# Patient Record
Sex: FEMALE | Race: WHITE | Employment: FULL TIME
[De-identification: names, ages, dates, MRNs, and addresses within clinical notes are randomized per-mention and may not be internally consistent; named-entity substitution may affect disease eponyms.]

---

## 2017-06-10 ENCOUNTER — HEALTH MAINTENANCE LETTER (OUTPATIENT)
Age: 22
End: 2017-06-10

## 2017-09-15 ENCOUNTER — RESULT FOLLOW UP (OUTPATIENT)
Dept: OBGYN | Facility: CLINIC | Age: 22
End: 2017-09-15

## 2017-09-15 ENCOUNTER — OFFICE VISIT (OUTPATIENT)
Dept: OBGYN | Facility: CLINIC | Age: 22
End: 2017-09-15
Payer: COMMERCIAL

## 2017-09-15 VITALS
HEIGHT: 71 IN | DIASTOLIC BLOOD PRESSURE: 60 MMHG | SYSTOLIC BLOOD PRESSURE: 102 MMHG | BODY MASS INDEX: 23.52 KG/M2 | WEIGHT: 168 LBS

## 2017-09-15 DIAGNOSIS — Z01.419 ENCOUNTER FOR GYNECOLOGICAL EXAMINATION WITHOUT ABNORMAL FINDING: Primary | ICD-10-CM

## 2017-09-15 DIAGNOSIS — R87.612 PAPANICOLAOU SMEAR OF CERVIX WITH LOW GRADE SQUAMOUS INTRAEPITHELIAL LESION (LGSIL): ICD-10-CM

## 2017-09-15 DIAGNOSIS — Z97.5 IUD (INTRAUTERINE DEVICE) IN PLACE: ICD-10-CM

## 2017-09-15 DIAGNOSIS — Z11.3 SCREEN FOR STD (SEXUALLY TRANSMITTED DISEASE): ICD-10-CM

## 2017-09-15 DIAGNOSIS — F41.9 ANXIETY: ICD-10-CM

## 2017-09-15 PROCEDURE — 99395 PREV VISIT EST AGE 18-39: CPT | Performed by: OBSTETRICS & GYNECOLOGY

## 2017-09-15 PROCEDURE — 87591 N.GONORRHOEAE DNA AMP PROB: CPT | Performed by: OBSTETRICS & GYNECOLOGY

## 2017-09-15 PROCEDURE — G0145 SCR C/V CYTO,THINLAYER,RESCR: HCPCS | Performed by: OBSTETRICS & GYNECOLOGY

## 2017-09-15 PROCEDURE — G0124 SCREEN C/V THIN LAYER BY MD: HCPCS | Performed by: OBSTETRICS & GYNECOLOGY

## 2017-09-15 PROCEDURE — 87491 CHLMYD TRACH DNA AMP PROBE: CPT | Performed by: OBSTETRICS & GYNECOLOGY

## 2017-09-15 ASSESSMENT — ANXIETY QUESTIONNAIRES
6. BECOMING EASILY ANNOYED OR IRRITABLE: MORE THAN HALF THE DAYS
7. FEELING AFRAID AS IF SOMETHING AWFUL MIGHT HAPPEN: NOT AT ALL
GAD7 TOTAL SCORE: 10
3. WORRYING TOO MUCH ABOUT DIFFERENT THINGS: SEVERAL DAYS
5. BEING SO RESTLESS THAT IT IS HARD TO SIT STILL: NEARLY EVERY DAY
2. NOT BEING ABLE TO STOP OR CONTROL WORRYING: SEVERAL DAYS
IF YOU CHECKED OFF ANY PROBLEMS ON THIS QUESTIONNAIRE, HOW DIFFICULT HAVE THESE PROBLEMS MADE IT FOR YOU TO DO YOUR WORK, TAKE CARE OF THINGS AT HOME, OR GET ALONG WITH OTHER PEOPLE: SOMEWHAT DIFFICULT
1. FEELING NERVOUS, ANXIOUS, OR ON EDGE: MORE THAN HALF THE DAYS

## 2017-09-15 ASSESSMENT — PATIENT HEALTH QUESTIONNAIRE - PHQ9
5. POOR APPETITE OR OVEREATING: SEVERAL DAYS
SUM OF ALL RESPONSES TO PHQ QUESTIONS 1-9: 3

## 2017-09-15 NOTE — LETTER
September 19, 2019      Vida Alvarez  [address]  [place] [address]    Dear ,      This letter is to remind you that you are due for your follow up PAP smear on or about 10/03/19.    Please call [phone] to schedule your appointment at your earliest convenience.     If you have completed the tests outside of [place], please have the results forwarded to our office. We will update the chart for your primary Physician to review before your next annual physical.     Sincerely,      Your [place] Care Team/[facility]

## 2017-09-15 NOTE — LETTER
August 31, 2018      Vida Broussardtianna  [address] [address]    Dear ,      At [facility], your health and wellness is our primary concern. That is why we are following up on an abnormal pap from 09/15/17, which was reported as LSIL. Your provider had recommended that you have a Pap smear completed by 09/15/18. Our records do not show that this has been scheduled.    It is important to complete the follow up that your provider has suggested for you to ensure that there are no worsening changes which may, over time, develop into cancer.      Please contact our office at  [phone] to schedule an appointment for a Pap smear at your earliest convenience. If you have questions or concerns, please call the clinic and we will be happy to assist you.    If you have completed the tests outside of [facility], please have the results forwarded to our office. We will update the chart for your primary Physician to review before your next annual physical.     Thank you for choosing [facility]!    Sincerely,      Zina Meyers MD/felisha

## 2017-09-15 NOTE — PROGRESS NOTES
Vida is a 21 year old  female who presents for annual exam.     Besides routine health maintenance,  she would like to discuss anxiety & lump/possible cyst on breast a month ago.    HPI:  Here today for yearly exam --has seen CE in the past.  Amenorrheic with Mirena IUD in place (2016).  Rare spotting and no cramping.  +SA --no issues.  Had breakup earlier this year --has had new partners since that time.  Denies bowel/bladder issues.   Hx gluten sensitivity --does good job with diet.  Has noticed some dairy issues as well as of late  Has noticed more anxiety in the last few months --was on sertraline in college for a while.  Has had a lot of life changes --breakup, move, new job, travel for work, etc.  Some difficulty concentrating and feeling overwhelmed by tasks.  Has never been tested for ADHD.  Has never had counselor/therapist    Single; works for [employer] as  --has been traveling a lot for job -- in [place]; currently seeking a new job  -staying active; belongs to gym and has been doing yoga as much as she can; also helps with anxiety  +SBE on occ; no issues      GYNECOLOGIC HISTORY:    Patient's last menstrual period was 2017 (exact date).  Her current contraception method is: IUD.  She  reports that she has never smoked. She has never used smokeless tobacco.      Patient is sexually active.  STD testing offered?  Declined  Last PHQ-9 score on record =   PHQ-9 SCORE 9/15/2017   Total Score 3     Last GAD7 score on record =   OSORIO-7 SCORE 9/15/2017   Total Score 10     Alcohol Score = 6    HEALTH MAINTENANCE:  Cholesterol: None found.  Last Mammo: Never, Result: not applicable  Pap: (No results found for: PAP ) Due today  Colonoscopy:  Never, Result: not applicable  Dexa:  Never    Health maintenance updated:  yes, doing first pap today    HISTORY:  Obstetric History       T0      L0     SAB0   TAB0   Ectopic0   Multiple0   Live Births0      "      Patient Active Problem List   Diagnosis     Airway obstruction, anatomic     IUD (intrauterine device) in place     Past Surgical History:   Procedure Laterality Date     LAPAROSCOPIC APPENDECTOMY N/A 10/28/2015    Procedure: LAPAROSCOPIC APPENDECTOMY;  Surgeon: Osmin Tirado MD;  Location: [facility] OR     SEPTORHINOPLASTY  5/10/2013    Procedure: SEPTORHINOPLASTY;  Septoplasty/tip Rhinoplasty, Bilateral superficial cauterization of turbinates       TONSILLECTOMY  1998      Social History   Substance Use Topics     Smoking status: Never Smoker     Smokeless tobacco: Never Used     Alcohol use 0.0 oz/week     0 Standard drinks or equivalent per week      Comment: occ      Problem (# of Occurrences) Relation (Name,Age of Onset)    Family History Negative (1) Other            Current Outpatient Prescriptions   Medication Sig     sertraline (ZOLOFT) 50 MG tablet Take 1/2 tablet (25 mg) for 1-2 weeks, then increase to 1 tablet orally daily     levonorgestrel (MIRENA, 52 MG,) 20 MCG/24HR IUD 1 each (20 mcg) by Intrauterine route once for 1 dose     [DISCONTINUED] Sertraline HCl (ZOLOFT PO) Take 25 mg by mouth daily     No current facility-administered medications for this visit.      No Known Allergies    Past medical, surgical, social and family histories were reviewed and updated in EPIC.    ROS:   12 point review of systems negative other than symptoms noted below.  Breast: Lumps  Gastrointestinal: Bloating  Skin: Acne  Psychiatric: Anxiety    EXAM:  /60  Ht 5' 10.5\" (1.791 m)  Wt 168 lb (76.2 kg)  LMP 09/12/2017 (Exact Date)  BMI 23.76 kg/m2   BMI: Body mass index is 23.76 kg/(m^2).    PHYSICAL EXAM:  Constitutional:  Appearance: Well nourished, well developed, alert, in no acute distress  Neck:  Lymph Nodes:  No lymphadenopathy present    Thyroid:  Gland size normal, nontender, no nodules or masses present  on palpation  Chest:  Respiratory Effort:  Breathing unlabored  Cardiovascular:    Heart: " Auscultation:  Regular rate, normal rhythm, no murmurs present  Breasts: Inspection of Breasts:  No lymphadenopathy present., Palpation of Breasts and Axillae:  No masses present on palpation, no breast tenderness., Axillary Lymph Nodes:  No lymphadenopathy present. and No nodularity, asymmetry or nipple discharge bilaterally.  Gastrointestinal:   Abdominal Examination:  Abdomen nontender to palpation, tone normal without rigidity or guarding, no masses present, umbilicus without lesions   Liver and Spleen:  No hepatomegaly present, liver nontender to palpation    Hernias:  No hernias present  Lymphatic: Lymph Nodes:  No other lymphadenopathy present  Skin:  General Inspection:  No rashes present, no lesions present, no areas of  discoloration    Genitalia and Groin:  No rashes present, no lesions present, no areas of  discoloration, no masses present  Neurologic/Psychiatric:    Mental Status:  Oriented X3     Pelvic Exam:  External Genitalia:     Normal appearance for age, no discharge present, no tenderness present, no inflammatory lesions present, color normal  Vagina:    Normal vaginal vault without central or paravaginal defects, no discharge present, no inflammatory lesions present, no masses present  Bladder:     Nontender to palpation  Urethra:   Urethral Body:  Urethra palpation normal, urethra structural support normal   Urethral Meatus:  No erythema or lesions present  Cervix:     Appearance healthy, no lesions present, nontender to palpation, no bleeding present, string present  Uterus:     Nontender to palpation, no masses present, position anteflexed, mobility: normal  Adnexa:     No adnexal tenderness present, no adnexal masses present  Perineum:     Perineum within normal limits, no evidence of trauma, no rashes or skin lesions present  Anus:     Anus within normal limits, no hemorrhoids present  Inguinal Lymph Nodes:     No lymphadenopathy present  Pubic Hair:     Normal pubic hair distribution for  age  Genitalia and Groin:     No rashes present, no lesions present, no areas of discoloration, no masses present      COUNSELING:   Reviewed preventive health counseling, as reflected in patient instructions  Special attention given to:        Regular exercise       Healthy diet/nutrition       Contraception       Safe sex practices/STD prevention    BMI: Body mass index is 23.76 kg/(m^2).        ASSESSMENT:  21 year old female with satisfactory annual exam.  ICD-10-CM    1. Encounter for gynecological examination without abnormal finding Z01.419 Pap imaged thin layer screen only - recommended age 21 - 24 years   2. Anxiety F41.9 sertraline (ZOLOFT) 50 MG tablet   3. IUD (intrauterine device) in place Z97.5    4. Screen for STD (sexually transmitted disease) Z11.3 Chlamydia trachomatis PCR     Neisseria gonorrhoeae PCR       PLAN:  Patient Instructions   Follow up with your primary care provider for your other medical problems.  Continue self breast exam.  Increase physical activity and exercise.  PHQ-9/OSORIO-7 scores were discussed.  Will re-start sertraline for anxiety --if no improvement in focus and work performance in next 4-8wks, may consider ADHD evaluation.  Will call as needed for referral --would consider either [place] referral or [facility]  Lab and pap smear results will be called to the patient.  First reflex pap smear done today and will repeat in 3yrs if negative.  Usual safety and preventative measures counseling done.  Encouraged Vida to consider full STD testing with any new sexual partners.      Zina Meyers MD

## 2017-09-15 NOTE — PATIENT INSTRUCTIONS
Follow up with your primary care provider for your other medical problems.  Continue self breast exam.  Increase physical activity and exercise.  PHQ-9/OSORIO-7 scores were discussed.  Will re-start sertraline for anxiety --if no improvement in focus and work performance in next 4-8wks, may consider ADHD evaluation.  Will call as needed for referral --would consider either [place] referral or [facility]  Lab and pap smear results will be called to the patient.  First reflex pap smear done today and will repeat in 3yrs if negative.  Usual safety and preventative measures counseling done.  Encouraged Vida to consider full STD testing with any new sexual partners.

## 2017-09-15 NOTE — LETTER
September 22, 2017    Vida Alvarez  [address] [address] [address]    Dear Vida,  This letter is in regards to the PAP smear you had done on 9/15/17. The test result is stated to be LSIL or Low-grade Squamous Intraepithelial Lesion. This indicates a mild change only. The vast majority of patients with this result do not have significant cervical abnormalities. Your next PAP smear is due in 1 year.    Please return on or after 9/15/18 for your next pap smear.  Please contact the clinic at [phone] with any questions.  Sincerely,  Zina Meyers MD/howard

## 2017-09-15 NOTE — MR AVS SNAPSHOT
After Visit Summary   9/15/2017    Vida Alvarez    MRN: [id number]           Patient Information     Date Of Birth          1995        Visit Information        Provider Department      9/15/2017 9:30 AM Zina Meyers MD [facility]        Today's Diagnoses     Encounter for gynecological examination without abnormal finding    -  1    Anxiety        IUD (intrauterine device) in place        Screen for STD (sexually transmitted disease)          Care Instructions    Follow up with your primary care provider for your other medical problems.  Continue self breast exam.  Increase physical activity and exercise.  PHQ-9/OSORIO-7 scores were discussed.  Will re-start sertraline for anxiety --if no improvement in focus and work performance in next 4-8wks, may consider ADHD evaluation.  Will call as needed for referral --would consider either [place] referral or [facility]  Lab and pap smear results will be called to the patient.  First reflex pap smear done today and will repeat in 3yrs if negative.  Usual safety and preventative measures counseling done.  Encouraged Vida to consider full STD testing with any new sexual partners.          Follow-ups after your visit        Follow-up notes from your care team     Return in about 1 year (around 9/15/2018) for Annual Exam.      Who to contact     If you have questions or need follow up information about today's clinic visit or your schedule please contact [facility] directly at [phone].  Normal or non-critical lab and imaging results will be communicated to you by MyChart, letter or phone within 4 business days after the clinic has received the results. If you do not hear from us within 7 days, please contact the clinic through MyChart or phone. If you have a critical or abnormal lab result, we will notify you by phone as soon as possible.  Submit refill requests through [employer] or call your  "pharmacy and they will forward the refill request to us. Please allow 3 business days for your refill to be completed.          Additional Information About Your Visit        MyChart Information     [employer] lets you send messages to your doctor, view your test results, renew your prescriptions, schedule appointments and more. To sign up, go to [email or web address] . Click on \"Log in\" on the left side of the screen, which will take you to the Welcome page. Then click on \"Sign up Now\" on the right side of the page.     You will be asked to enter the access code listed below, as well as some personal information. Please follow the directions to create your username and password.     Your access code is: [email or web address]  Expires: 2017  1:15 PM     Your access code will  in 90 days. If you need help or a new code, please call your [place] clinic or [phone].        Care EveryWhere ID     This is your Care EveryWhere ID. This could be used by other organizations to access your [place] medical records  [id number]        Your Vitals Were     Height Last Period BMI (Body Mass Index)             5' 10.5\" (1.791 m) 2017 (Exact Date) 23.76 kg/m2          Blood Pressure from Last 3 Encounters:   09/15/17 102/60   16 102/70   16 98/62    Weight from Last 3 Encounters:   09/15/17 168 lb (76.2 kg)   16 160 lb (72.6 kg)   16 158 lb (71.7 kg)              We Performed the Following     Chlamydia trachomatis PCR     Neisseria gonorrhoeae PCR     Pap imaged thin layer screen only - recommended age 21 - 24 years          Today's Medication Changes          These changes are accurate as of: 9/15/17  1:15 PM.  If you have any questions, ask your nurse or doctor.               Start taking these medicines.        Dose/Directions    sertraline 50 MG tablet   Commonly known as:  ZOLOFT   Used for:  Anxiety   Started by:  Zina Meyers MD        Take 1/2 tablet (25 mg) for 1-2 weeks, then " increase to 1 tablet orally daily   Quantity:  90 tablet   Refills:  3            Where to get your medicines      These medications were sent to [facility] [id number] [facility] - [place], [place] - [address]  [address], [facility] [address]     Phone:  [phone]     sertraline 50 MG tablet                Primary Care Provider    None Specified       No primary provider on file.        Equal Access to Services     BREA [facility]D : Hadii aad ku hadasho Soomaali, waaxda luqadaha, qaybta kaalmada adeegyada, uzma aceves hayaan adeálvaro kheneowen deleon . So [facility] [phone].    ATENCIÓN: Si habla español, tiene a mtz disposición servicios gratuitos de asistencia lingüística. Golden al [phone].    We comply with applicable federal civil rights laws and [place] laws. We do not discriminate on the basis of race, color, national origin, age, disability sex, sexual orientation or gender identity.            Thank you!     Thank you for choosing [facility] [place]  for your care. Our goal is always to provide you with excellent care. Hearing back from our patients is one way we can continue to improve our services. Please take a few minutes to complete the written survey that you may receive in the mail after your visit with us. Thank you!             Your Updated Medication List - Protect others around you: Learn how to safely use, store and throw away your medicines at www.disposemymeds.org.          This list is accurate as of: 9/15/17  1:15 PM.  Always use your most recent med list.                   Brand Name Dispense Instructions for use Diagnosis    levonorgestrel 20 MCG/24HR IUD    MIRENA (52 MG)    1 each    1 each (20 mcg) by Intrauterine route once for 1 dose    Encounter for IUD insertion       sertraline 50 MG tablet    ZOLOFT    90 tablet    Take 1/2 tablet (25 mg) for 1-2 weeks, then increase to 1 tablet orally daily    Anxiety

## 2017-09-16 ASSESSMENT — ANXIETY QUESTIONNAIRES: GAD7 TOTAL SCORE: 10

## 2017-09-17 LAB
C TRACH DNA SPEC QL NAA+PROBE: NEGATIVE
N GONORRHOEA DNA SPEC QL NAA+PROBE: NEGATIVE
SPECIMEN SOURCE: NORMAL
SPECIMEN SOURCE: NORMAL

## 2017-09-20 LAB
COPATH REPORT: ABNORMAL
PAP: ABNORMAL

## 2017-09-21 PROBLEM — R87.612 PAPANICOLAOU SMEAR OF CERVIX WITH LOW GRADE SQUAMOUS INTRAEPITHELIAL LESION (LGSIL): Status: ACTIVE | Noted: 2017-09-15

## 2017-09-21 NOTE — PROGRESS NOTES
9/15/17 LSIL pap. Plan: pap only in 1 year  08/31/18 Pap reminder letter sent. ([facility])  10/3/18 NIL pap. Plan pap in one year. ([facility])  09/19/19 Pap reminder letter sent. ([facility])  10/24/19 NIL pap . Plan: pap in 3 years ([facility])

## 2018-04-20 ENCOUNTER — OFFICE VISIT (OUTPATIENT)
Dept: OBGYN | Facility: CLINIC | Age: 23
End: 2018-04-20
Payer: COMMERCIAL

## 2018-04-20 VITALS
BODY MASS INDEX: 23.52 KG/M2 | DIASTOLIC BLOOD PRESSURE: 64 MMHG | WEIGHT: 168 LBS | HEIGHT: 71 IN | HEART RATE: 68 BPM | SYSTOLIC BLOOD PRESSURE: 110 MMHG

## 2018-04-20 DIAGNOSIS — N89.8 VAGINAL ODOR: Primary | ICD-10-CM

## 2018-04-20 LAB
SPECIMEN SOURCE: ABNORMAL
WET PREP SPEC: ABNORMAL

## 2018-04-20 PROCEDURE — 87210 SMEAR WET MOUNT SALINE/INK: CPT | Performed by: NURSE PRACTITIONER

## 2018-04-20 PROCEDURE — 99213 OFFICE O/P EST LOW 20 MIN: CPT | Performed by: NURSE PRACTITIONER

## 2018-04-20 RX ORDER — METRONIDAZOLE 7.5 MG/G
1 GEL VAGINAL AT BEDTIME
Qty: 70 G | Refills: 0 | Status: SHIPPED | OUTPATIENT
Start: 2018-04-20 | End: 2019-02-21

## 2018-04-20 NOTE — MR AVS SNAPSHOT
"              After Visit Summary   2018    Vida Alvarez    MRN: [id number]           Patient Information     Date Of Birth          1995        Visit Information        Provider Department      2018 9:30 AM Jaki Lugo APRN CNP [facility]        Today's Diagnoses     Vaginal odor    -  1       Follow-ups after your visit        Who to contact     If you have questions or need follow up information about today's clinic visit or your schedule please contact [facility] directly at [phone].  Normal or non-critical lab and imaging results will be communicated to you by [employer], letter or phone within 4 business days after the clinic has received the results. If you do not hear from us within 7 days, please contact the clinic through [employer] or phone. If you have a critical or abnormal lab result, we will notify you by phone as soon as possible.  Submit refill requests through [employer] or call your pharmacy and they will forward the refill request to us. Please allow 3 business days for your refill to be completed.          Additional Information About Your Visit        MyChart Information     [employer] lets you send messages to your doctor, view your test results, renew your prescriptions, schedule appointments and more. To sign up, go to [email or web address] . Click on \"Log in\" on the left side of the screen, which will take you to the Welcome page. Then click on \"Sign up Now\" on the right side of the page.     You will be asked to enter the access code listed below, as well as some personal information. Please follow the directions to create your username and password.     Your access code is: [email or web address]  Expires: 2018  9:59 AM     Your access code will  in 90 days. If you need help or a new code, please call your [facility] or [phone].        Care EveryWhere ID     This is your Care EveryWhere ID. This could be used by " "other organizations to access your [place] medical records  [id number]        Your Vitals Were     Pulse Height Breastfeeding? BMI (Body Mass Index)          68 5' 10.5\" (1.791 m) No 23.76 kg/m2         Blood Pressure from Last 3 Encounters:   04/20/18 110/64   09/15/17 102/60   09/21/16 102/70    Weight from Last 3 Encounters:   04/20/18 168 lb (76.2 kg)   09/15/17 168 lb (76.2 kg)   09/21/16 160 lb (72.6 kg)              We Performed the Following     Wet  Prep          Today's Medication Changes          These changes are accurate as of 4/20/18  9:59 AM.  If you have any questions, ask your nurse or doctor.               Start taking these medicines.        Dose/Directions    metroNIDAZOLE 0.75 % vaginal gel   Commonly known as:  METROGEL   Used for:  Vaginal odor   Started by:  Jaki Lugo APRN CNP        Dose:  1 applicator   Place 1 applicator (5 g) vaginally At Bedtime for 5 days   Quantity:  70 g   Refills:  0            Where to get your medicines      These medications were sent to [facility] [address] [place], [place] [id number] [facility] [address], [facility] [address]     Phone:  [phone]     metroNIDAZOLE 0.75 % vaginal gel                Primary Care Provider Fax #    Physician No Ref-Primary [phone]       No address on file        Equal Access to Services     BAILEY AHUMADA AH: Hadii carlotta hutchinsono Soangelaali, waaxda luqadaha, qaybta kaalmada cash, uzma redmond. So [facility] [phone].    ATENCIÓN: Si habla español, tiene a mtz disposición servicios gratuitos de asistencia lingüística. Llame al [phone].    We comply with applicable federal civil rights laws and [place] laws. We do not discriminate on the basis of race, color, national origin, age, disability, sex, sexual orientation, or gender identity.            Thank you!     Thank you for choosing [facility]  for your care. Our " goal is always to provide you with excellent care. Hearing back from our patients is one way we can continue to improve our services. Please take a few minutes to complete the written survey that you may receive in the mail after your visit with us. Thank you!             Your Updated Medication List - Protect others around you: Learn how to safely use, store and throw away your medicines at www.disposemymeds.org.          This list is accurate as of 4/20/18  9:59 AM.  Always use your most recent med list.                   Brand Name Dispense Instructions for use Diagnosis    levonorgestrel 20 MCG/24HR IUD    MIRENA (52 MG)    1 each    1 each (20 mcg) by Intrauterine route once for 1 dose    Encounter for IUD insertion       LINZESS PO      Take 145 mcg by mouth every morning (before breakfast)        metroNIDAZOLE 0.75 % vaginal gel    METROGEL    70 g    Place 1 applicator (5 g) vaginally At Bedtime for 5 days    Vaginal odor       sertraline 50 MG tablet    ZOLOFT    90 tablet    Take 1/2 tablet (25 mg) for 1-2 weeks, then increase to 1 tablet orally daily    Anxiety

## 2018-04-20 NOTE — PROGRESS NOTES
SUBJECTIVE:                                                   Vida Alvarez is a 22 year old female who presents to clinic today for the following health issue(s):  Patient presents with:  Vaginal Problem: c/o vaginal odor, after IC      Additional information: denies any itching or change in discharge    HPI:  Pt here today with c/o vaginal odor that her partner notices after IC. She uses dove soap, it is scented. She's not sure what he uses.     They dont' use condoms, lube or spermicide.     No LMP recorded. Patient is not currently having periods (Reason: IUD)..   Patient is sexually active, .  Using IUD for contraception.    reports that she has never smoked. She has never used smokeless tobacco.    STD testing offered?  Declined    Health maintenance updated:  yes    Today's PHQ-2 Score: No flowsheet data found.  Today's PHQ-9 Score:   PHQ-9 SCORE 9/15/2017   Total Score 3     Today's OSORIO-7 Score:   OSORIO-7 SCORE 9/15/2017   Total Score 10       Problem list and histories reviewed & adjusted, as indicated.  Additional history: as documented.    Patient Active Problem List   Diagnosis     Airway obstruction, anatomic     IUD (intrauterine device) in place     Non-celiac gluten sensitivity     Papanicolaou smear of cervix with low grade squamous intraepithelial lesion (LGSIL)     Past Surgical History:   Procedure Laterality Date     LAPAROSCOPIC APPENDECTOMY N/A 10/28/2015    Procedure: LAPAROSCOPIC APPENDECTOMY;  Surgeon: Osmin Tirado MD;  Location: [facility]     SEPTORHINOPLASTY  5/10/2013    Procedure: SEPTORHINOPLASTY;  Septoplasty/tip Rhinoplasty, Bilateral superficial cauterization of turbinates       TONSILLECTOMY        Social History   Substance Use Topics     Smoking status: Never Smoker     Smokeless tobacco: Never Used     Alcohol use 0.0 oz/week     0 Standard drinks or equivalent per week      Comment: occ      Problem (# of Occurrences) Relation (Name,Age of Onset)    Family  "History Negative (1) Other            Current Outpatient Prescriptions   Medication Sig     levonorgestrel (MIRENA, 52 MG,) 20 MCG/24HR IUD 1 each (20 mcg) by Intrauterine route once for 1 dose     Linaclotide (LINZESS PO) Take 145 mcg by mouth every morning (before breakfast)     metroNIDAZOLE (METROGEL) 0.75 % vaginal gel Place 1 applicator (5 g) vaginally At Bedtime for 5 days     sertraline (ZOLOFT) 50 MG tablet Take 1/2 tablet (25 mg) for 1-2 weeks, then increase to 1 tablet orally daily     No current facility-administered medications for this visit.      No Known Allergies    ROS:  12 point review of systems negative other than symptoms noted below.    OBJECTIVE:     /64  Pulse 68  Ht 5' 10.5\" (1.791 m)  Wt 168 lb (76.2 kg)  Breastfeeding? No  BMI 23.76 kg/m2  Body mass index is 23.76 kg/(m^2).    Exam:  Constitutional:  Appearance: Well nourished, well developed alert, in no acute distress  Neurologic/Psychiatric:  Mental Status:  Oriented X3   Pelvic Exam:  External Genitalia:     Normal appearance for age, no discharge present, no tenderness present, no inflammatory lesions present, color normal  Vagina:    Normal vaginal vault without central or paravaginal defects, minimal amount of thin cream colored discharge present, no inflammatory lesions present, no masses present  Bladder:     Nontender to palpation  Urethra:   Urethral Body:  Urethra palpation normal, urethra structural support normal   Urethral Meatus:  No erythema or lesions present  Cervix:     Appearance healthy, no lesions present, nontender to palpation, no bleeding present, string present  Uterus:     Nontender to palpation, no masses present, position anteflexed, mobility: normal  Adnexa:     No adnexal tenderness present, no adnexal masses present  Perineum:     Perineum within normal limits, no evidence of trauma, no rashes or skin lesions present  Anus:     Anus within normal limits, no hemorrhoids present  Inguinal Lymph " Nodes:     No lymphadenopathy present  Pubic Hair:     Normal pubic hair distribution for age  Genitalia and Groin:     No rashes present, no lesions present, no areas of discoloration, no masses present       In-Clinic Test Results:  Results for orders placed or performed in visit on 04/20/18 (from the past 24 hour(s))   Wet  Prep   Result Value Ref Range    Specimen Description Vagina     Wet Prep Clue cells seen (A)     Wet Prep No yeast seen     Wet Prep No Trichomonas seen        ASSESSMENT/PLAN:                                                        ICD-10-CM    1. Vaginal odor N89.8 Wet  Prep     metroNIDAZOLE (METROGEL) 0.75 % vaginal gel       There are no Patient Instructions on file for this visit.    Wet prep:+ for clue cells. Will treat with metrogel. Encouraged warm bath soaks. Change to unscented soaps for her and her BF.      ELISA Franco [facility]

## 2018-10-03 ENCOUNTER — OFFICE VISIT (OUTPATIENT)
Dept: OBGYN | Facility: CLINIC | Age: 23
End: 2018-10-03
Payer: COMMERCIAL

## 2018-10-03 VITALS
HEART RATE: 68 BPM | BODY MASS INDEX: 22.68 KG/M2 | DIASTOLIC BLOOD PRESSURE: 62 MMHG | WEIGHT: 162 LBS | SYSTOLIC BLOOD PRESSURE: 118 MMHG | HEIGHT: 71 IN

## 2018-10-03 DIAGNOSIS — Z11.3 SCREEN FOR STD (SEXUALLY TRANSMITTED DISEASE): ICD-10-CM

## 2018-10-03 DIAGNOSIS — Z01.419 ENCOUNTER FOR GYNECOLOGICAL EXAMINATION WITHOUT ABNORMAL FINDING: Primary | ICD-10-CM

## 2018-10-03 DIAGNOSIS — F41.9 ANXIETY: ICD-10-CM

## 2018-10-03 DIAGNOSIS — Z23 NEED FOR PROPHYLACTIC VACCINATION AND INOCULATION AGAINST INFLUENZA: ICD-10-CM

## 2018-10-03 DIAGNOSIS — Z97.5 IUD (INTRAUTERINE DEVICE) IN PLACE: ICD-10-CM

## 2018-10-03 DIAGNOSIS — Z11.8 SCREENING FOR CHLAMYDIAL DISEASE: ICD-10-CM

## 2018-10-03 PROCEDURE — 90471 IMMUNIZATION ADMIN: CPT | Performed by: OBSTETRICS & GYNECOLOGY

## 2018-10-03 PROCEDURE — 90686 IIV4 VACC NO PRSV 0.5 ML IM: CPT | Performed by: OBSTETRICS & GYNECOLOGY

## 2018-10-03 PROCEDURE — 87591 N.GONORRHOEAE DNA AMP PROB: CPT | Performed by: OBSTETRICS & GYNECOLOGY

## 2018-10-03 PROCEDURE — 87491 CHLMYD TRACH DNA AMP PROBE: CPT | Performed by: OBSTETRICS & GYNECOLOGY

## 2018-10-03 PROCEDURE — 88175 CYTOPATH C/V AUTO FLUID REDO: CPT | Performed by: OBSTETRICS & GYNECOLOGY

## 2018-10-03 PROCEDURE — 99395 PREV VISIT EST AGE 18-39: CPT | Performed by: OBSTETRICS & GYNECOLOGY

## 2018-10-03 ASSESSMENT — ANXIETY QUESTIONNAIRES
2. NOT BEING ABLE TO STOP OR CONTROL WORRYING: NOT AT ALL
3. WORRYING TOO MUCH ABOUT DIFFERENT THINGS: NOT AT ALL
6. BECOMING EASILY ANNOYED OR IRRITABLE: NOT AT ALL
5. BEING SO RESTLESS THAT IT IS HARD TO SIT STILL: NOT AT ALL
7. FEELING AFRAID AS IF SOMETHING AWFUL MIGHT HAPPEN: NOT AT ALL
1. FEELING NERVOUS, ANXIOUS, OR ON EDGE: SEVERAL DAYS
IF YOU CHECKED OFF ANY PROBLEMS ON THIS QUESTIONNAIRE, HOW DIFFICULT HAVE THESE PROBLEMS MADE IT FOR YOU TO DO YOUR WORK, TAKE CARE OF THINGS AT HOME, OR GET ALONG WITH OTHER PEOPLE: NOT DIFFICULT AT ALL
GAD7 TOTAL SCORE: 1

## 2018-10-03 ASSESSMENT — PATIENT HEALTH QUESTIONNAIRE - PHQ9: 5. POOR APPETITE OR OVEREATING: NOT AT ALL

## 2018-10-03 NOTE — PATIENT INSTRUCTIONS
Follow up with your primary care provider for your other medical problems.  Continue self breast exam.  Increase physical activity and exercise.  PHQ-9/OSORIO-7 scores were discussed.  Would like to continue on current dose of zoloft.  Lab and pap smear results will be called to the patient.  Pap smear done today due to LGSIL on last year's first pap smear.  If negative, ASCUS or LGSIL, will repeat in 1yr per protocol.  Usual safety and preventative measures counseling done.  Flu Shot today.

## 2018-10-03 NOTE — PROGRESS NOTES
Vida is a 22 year old  female who presents for annual exam.     Besides routine health maintenance, she has no other health concerns today .    HPI:  Here today for yearly exam --doing well.  Amenorrheic with mirena IUD (2016).  +SA --no issues.  Interested in only GC/Chlam this year --monogamous relationship x 1yr.  Was diagnosed with IBS-C this year due to ongoing issues with constipation.  Had normal colonoscopy and upper endoscopy.  Doing well on linzess and followed by [facility].  No bladder issues.    Dating --boyfriend x 1yr; works as  for [employer] --changed jobs in May so thankfully not traveling as much  -staying active with pilates several days per week and cardio  No SBE --no personal or family hx breast dz  -doing well on zoloft; anxiety minimal but would like to continue  -agrees to flu shot today      GYNECOLOGIC HISTORY:    No LMP recorded. Patient is not currently having periods (Reason: IUD).  Her current contraception method is: IUD.  She  reports that she has never smoked. She has never used smokeless tobacco.    Patient is sexually active.  STD testing offered?  Accepted  Last PHQ-9 score on record =   PHQ-9 SCORE 10/3/2018   Total Score 2     Last GAD7 score on record =   OSORIO-7 SCORE 10/3/2018   Total Score 1     Alcohol Score = 6    HEALTH MAINTENANCE:  No lipids found.  Last Mammo: Never, Result: not applicable  Pap:   Lab Results   Component Value Date    PAP LSIL 09/15/2017      Colonoscopy:  Never, Result: not applicable  Dexa:  Never    Health maintenance updated:  yes    HISTORY:  Obstetric History       T0      L0     SAB0   TAB0   Ectopic0   Multiple0   Live Births0           Patient Active Problem List   Diagnosis     Airway obstruction, anatomic     IUD (intrauterine device) in place     Non-celiac gluten sensitivity     Papanicolaou smear of cervix with low grade squamous intraepithelial lesion (LGSIL)     Past Surgical History:  "  Procedure Laterality Date     LAPAROSCOPIC APPENDECTOMY N/A 10/28/2015    Procedure: LAPAROSCOPIC APPENDECTOMY;  Surgeon: Osmin Tirado MD;  Location: [facility]     SEPTORHINOPLASTY  5/10/2013    Procedure: SEPTORHINOPLASTY;  Septoplasty/tip Rhinoplasty, Bilateral superficial cauterization of turbinates       TONSILLECTOMY  1998      Social History   Substance Use Topics     Smoking status: Never Smoker     Smokeless tobacco: Never Used     Alcohol use 0.0 oz/week     0 Standard drinks or equivalent per week      Comment: occ      Problem (# of Occurrences) Relation (Name,Age of Onset)    Family History Negative (1) Other            Current Outpatient Prescriptions   Medication Sig     levonorgestrel (MIRENA, 52 MG,) 20 MCG/24HR IUD 1 each (20 mcg) by Intrauterine route once for 1 dose     Linaclotide (LINZESS PO) Take 145 mcg by mouth every morning (before breakfast)     sertraline (ZOLOFT) 50 MG tablet Take 1/2 tablet (25 mg) for 1-2 weeks, then increase to 1 tablet orally daily     [DISCONTINUED] sertraline (ZOLOFT) 50 MG tablet Take 1/2 tablet (25 mg) for 1-2 weeks, then increase to 1 tablet orally daily     No current facility-administered medications for this visit.      No Known Allergies    Past medical, surgical, social and family histories were reviewed and updated in EPIC.    ROS:   12 point review of systems negative other than symptoms noted below.  Skin: Acne    EXAM:  /62  Pulse 68  Ht 5' 10.5\" (1.791 m)  Wt 162 lb (73.5 kg)  BMI 22.92 kg/m2   BMI: Body mass index is 22.92 kg/(m^2).    PHYSICAL EXAM:  Constitutional:  Appearance: Well nourished, well developed, alert, in no acute distress  Neck:  Lymph Nodes:  No lymphadenopathy present    Thyroid:  Gland size normal, nontender, no nodules or masses present  on palpation  Chest:  Respiratory Effort:  Breathing unlabored  Cardiovascular:    Heart: Auscultation:  Regular rate, normal rhythm, no murmurs present  Breasts: Inspection of " Breasts:  No lymphadenopathy present., Palpation of Breasts and Axillae:  No masses present on palpation, no breast tenderness., Axillary Lymph Nodes:  No lymphadenopathy present. and No nodularity, asymmetry or nipple discharge bilaterally.  Gastrointestinal:   Abdominal Examination:  Abdomen nontender to palpation, tone normal without rigidity or guarding, no masses present, umbilicus without lesions   Liver and Spleen:  No hepatomegaly present, liver nontender to palpation    Hernias:  No hernias present  Lymphatic: Lymph Nodes:  No other lymphadenopathy present  Skin:  General Inspection:  No rashes present, no lesions present, no areas of  discoloration    Genitalia and Groin:  No rashes present, no lesions present, no areas of  discoloration, no masses present  Neurologic/Psychiatric:    Mental Status:  Oriented X3     Pelvic Exam:  External Genitalia:     Normal appearance for age, no discharge present, no tenderness present, no inflammatory lesions present, color normal  Vagina:    Normal vaginal vault without central or paravaginal defects, no discharge present, no inflammatory lesions present, no masses present  Bladder:     Nontender to palpation  Urethra:   Urethral Body:  Urethra palpation normal, urethra structural support normal   Urethral Meatus:  No erythema or lesions present  Cervix:     Appearance healthy, no lesions present, nontender to palpation, no bleeding present, string present  Uterus:     Nontender to palpation, no masses present, position anteflexed, mobility: normal  Adnexa:     No adnexal tenderness present, no adnexal masses present  Perineum:     Perineum within normal limits, no evidence of trauma, no rashes or skin lesions present  Anus:     Anus within normal limits, no hemorrhoids present  Inguinal Lymph Nodes:     No lymphadenopathy present  Pubic Hair:     Normal pubic hair distribution for age  Genitalia and Groin:     No rashes present, no lesions present, no areas of  discoloration, no masses present      COUNSELING:   Reviewed preventive health counseling, as reflected in patient instructions    BMI: Body mass index is 22.92 kg/(m^2).      ASSESSMENT:  22 year old female with satisfactory annual exam.    ICD-10-CM    1. Encounter for gynecological examination without abnormal finding Z01.419 Pap imaged thin layer screen only - recommended age 21 - 24 years   2. Anxiety F41.9 sertraline (ZOLOFT) 50 MG tablet   3. Screening for chlamydial disease Z11.8 Chlamydia trachomatis PCR   4. Screen for STD (sexually transmitted disease) Z11.3 Neisseria gonorrhoeae PCR   5. IUD (intrauterine device) in place Z97.5        PLAN:  Patient Instructions   Follow up with your primary care provider for your other medical problems.  Continue self breast exam.  Increase physical activity and exercise.  PHQ-9/OSORIO-7 scores were discussed.  Would like to continue on current dose of zoloft.  Lab and pap smear results will be called to the patient.  Pap smear done today due to LGSIL on last year's first pap smear.  If negative, ASCUS or LGSIL, will repeat in 1yr per protocol.  Usual safety and preventative measures counseling done.  Flu Shot today.      Zina Meyers MD

## 2018-10-03 NOTE — MR AVS SNAPSHOT
After Visit Summary   10/3/2018    Vida Alvarez    MRN: [id number]           Patient Information     Date Of Birth          1995        Visit Information        Provider Department      10/3/2018 9:00 AM Zina Meyers MD [facility]        Today's Diagnoses     Encounter for gynecological examination without abnormal finding    -  1    Anxiety        Screening for chlamydial disease        Screen for STD (sexually transmitted disease)        IUD (intrauterine device) in place          Care Instructions    Follow up with your primary care provider for your other medical problems.  Continue self breast exam.  Increase physical activity and exercise.  PHQ-9/OSORIO-7 scores were discussed.  Would like to continue on current dose of zoloft.  Lab and pap smear results will be called to the patient.  Pap smear done today due to LGSIL on last year's first pap smear.  If negative, ASCUS or LGSIL, will repeat in 1yr per protocol.  Usual safety and preventative measures counseling done.  Flu Shot today.          Follow-ups after your visit        Follow-up notes from your care team     Return in about 1 year (around 10/3/2019) for Annual Exam.      Who to contact     If you have questions or need follow up information about today's clinic visit or your schedule please contact [facility] directly at [phone].  Normal or non-critical lab and imaging results will be communicated to you by MyChart, letter or phone within 4 business days after the clinic has received the results. If you do not hear from us within 7 days, please contact the clinic through MyChart or phone. If you have a critical or abnormal lab result, we will notify you by phone as soon as possible.  Submit refill requests through [employer] or call your pharmacy and they will forward the refill request to us. Please allow 3 business days for your refill to be completed.          Additional  "Information About Your Visit        Care EveryWhere ID     This is your Care EveryWhere ID. This could be used by other organizations to access your [facility] medical records  [id number]        Your Vitals Were     Pulse Height BMI (Body Mass Index)             68 5' 10.5\" (1.791 m) 22.92 kg/m2          Blood Pressure from Last 3 Encounters:   10/03/18 118/62   04/20/18 110/64   09/15/17 102/60    Weight from Last 3 Encounters:   10/03/18 162 lb (73.5 kg)   04/20/18 168 lb (76.2 kg)   09/15/17 168 lb (76.2 kg)              We Performed the Following     Chlamydia trachomatis PCR     Neisseria gonorrhoeae PCR     Pap imaged thin layer screen only - recommended age 21 - 24 years          Where to get your medicines      These medications were sent to [facility] [id number]  [place], [place]  [address]  AT [address]  [address] [place] [address]     Phone:  [phone]     sertraline 50 MG tablet          Primary Care Provider Fax #    Physician No Ref-Primary [phone]       No address on file        Equal Access to Services     BAILEY AHUMADA AH: Hadii aad ku hadasho Soomaali, waaxda luqadaha, qaybta kaalmada adeegyada, waxay yola redmond. So [facility] [phone].    ATENCIÓN: Si habla español, tiene a mtz disposición servicios gratuitos de asistencia lingüística. Golden al [phone].    We comply with applicable federal civil rights laws and [place] laws. We do not discriminate on the basis of race, color, national origin, age, disability, sex, sexual orientation, or gender identity.            Thank you!     Thank you for choosing [facility]  for your care. Our goal is always to provide you with excellent care. Hearing back from our patients is one way we can continue to improve our services. Please take a few minutes to complete the written survey that you may receive in the mail after your visit with us. Thank " you!             Your Updated Medication List - Protect others around you: Learn how to safely use, store and throw away your medicines at www.disposemymeds.org.          This list is accurate as of 10/3/18  9:28 AM.  Always use your most recent med list.                   Brand Name Dispense Instructions for use Diagnosis    levonorgestrel 20 MCG/24HR IUD    MIRENA (52 MG)    1 each    1 each (20 mcg) by Intrauterine route once for 1 dose    Encounter for IUD insertion       LINZESS PO      Take 145 mcg by mouth every morning (before breakfast)        sertraline 50 MG tablet    ZOLOFT    90 tablet    Take 1/2 tablet (25 mg) for 1-2 weeks, then increase to 1 tablet orally daily    Anxiety

## 2018-10-03 NOTE — LETTER
October 5, 2018      Vida Alvarez  [address]  [place] [address]    Dear tianna,      I am happy to inform you that your recent cervical cancer screening test (PAP smear) was normal.      Preventative screenings such as this help to ensure your health for years to come. You should repeat a pap smear in 1 year, unless otherwise directed.      You will still need to return to the clinic every year for your annual exam and other preventive tests.     If you have additional questions regarding this result, please call our registered nurse, Suri at [phone].      Sincerely,      Zina Meyers MD/howard

## 2018-10-03 NOTE — PROGRESS NOTES

## 2018-10-04 ASSESSMENT — ANXIETY QUESTIONNAIRES: GAD7 TOTAL SCORE: 1

## 2018-10-04 ASSESSMENT — PATIENT HEALTH QUESTIONNAIRE - PHQ9: SUM OF ALL RESPONSES TO PHQ QUESTIONS 1-9: 2

## 2018-10-05 LAB
COPATH REPORT: NORMAL
PAP: NORMAL

## 2019-02-19 ENCOUNTER — TELEPHONE (OUTPATIENT)
Dept: OTOLARYNGOLOGY | Facility: CLINIC | Age: 24
End: 2019-02-19

## 2019-02-21 ENCOUNTER — OFFICE VISIT (OUTPATIENT)
Dept: MIDWIFE SERVICES | Facility: CLINIC | Age: 24
End: 2019-02-21
Payer: COMMERCIAL

## 2019-02-21 VITALS
BODY MASS INDEX: 22.3 KG/M2 | HEIGHT: 70 IN | WEIGHT: 155.8 LBS | DIASTOLIC BLOOD PRESSURE: 60 MMHG | SYSTOLIC BLOOD PRESSURE: 102 MMHG

## 2019-02-21 DIAGNOSIS — N89.8 VAGINAL DISCHARGE: Primary | ICD-10-CM

## 2019-02-21 DIAGNOSIS — N92.0 SPOTTING: ICD-10-CM

## 2019-02-21 LAB
SPECIMEN SOURCE: NORMAL
WET PREP SPEC: NORMAL

## 2019-02-21 PROCEDURE — 87210 SMEAR WET MOUNT SALINE/INK: CPT | Performed by: ADVANCED PRACTICE MIDWIFE

## 2019-02-21 PROCEDURE — 99213 OFFICE O/P EST LOW 20 MIN: CPT | Performed by: ADVANCED PRACTICE MIDWIFE

## 2019-02-21 ASSESSMENT — MIFFLIN-ST. JEOR: SCORE: 1541.95

## 2019-03-06 ENCOUNTER — OFFICE VISIT (OUTPATIENT)
Dept: MIDWIFE SERVICES | Facility: CLINIC | Age: 24
End: 2019-03-06
Payer: COMMERCIAL

## 2019-03-06 VITALS
WEIGHT: 160 LBS | HEART RATE: 60 BPM | SYSTOLIC BLOOD PRESSURE: 98 MMHG | DIASTOLIC BLOOD PRESSURE: 58 MMHG | BODY MASS INDEX: 22.9 KG/M2 | HEIGHT: 70 IN

## 2019-03-06 DIAGNOSIS — Z01.812 PRE-OPERATIVE LABORATORY EXAMINATION: ICD-10-CM

## 2019-03-06 DIAGNOSIS — Z01.818 PREOP GENERAL PHYSICAL EXAM: Primary | ICD-10-CM

## 2019-03-06 LAB — HGB BLD-MCNC: 14 G/DL (ref 11.7–15.7)

## 2019-03-06 PROCEDURE — 36415 COLL VENOUS BLD VENIPUNCTURE: CPT | Performed by: ADVANCED PRACTICE MIDWIFE

## 2019-03-06 PROCEDURE — 99213 OFFICE O/P EST LOW 20 MIN: CPT | Performed by: ADVANCED PRACTICE MIDWIFE

## 2019-03-06 PROCEDURE — 85018 HEMOGLOBIN: CPT | Performed by: ADVANCED PRACTICE MIDWIFE

## 2019-03-06 ASSESSMENT — MIFFLIN-ST. JEOR: SCORE: 1561.01

## 2019-03-06 NOTE — PROGRESS NOTES
[facility]  [address]  [place] [address]  [phone]  Dept: [phone]    PRE-OP EVALUATION:  Today's date: 3/6/2019    Vida Alvarez (: 1995) presents for pre-operative evaluation assessment as requested by Dr. Glover.  She requires evaluation and anesthesia risk assessment prior to undergoing surgery/procedure for treatment of Cosmetic .    Proposed Surgery/ Procedure: Rhinoplasty  Date of Surgery/ Procedure: 3/19/2019  Time of Surgery/ Procedure: 8 AM  Hospital/Surgical Facility: [facility]  Fax number for surgical facility: [phone]  Primary Physician: No Ref-Primary, Physician  Type of Anesthesia Anticipated: General    Patient has a Health Care Directive or Living Will:  NO    1. NO - Do you have a history of heart attack, stroke, stent, bypass or surgery on an artery in the head, neck, heart or legs?  2. NO - Do you ever have any pain or discomfort in your chest?  3. NO - Do you have a history of  Heart Failure?  4. NO - Are you troubled by shortness of breath when: walking on the level, up a slight hill or at night?  5. NO - Do you currently have a cold, bronchitis or other respiratory infection?  6. NO - Do you have a cough, shortness of breath or wheezing?  7. NO - Do you sometimes get pains in the calves of your legs when you walk?  8. NO - Do you or anyone in your family have previous history of blood clots?  9. NO - Do you or does anyone in your family have a serious bleeding problem such as prolonged bleeding following surgeries or cuts?  10. NO - Have you ever had problems with anemia or been told to take iron pills?  11. NO - Have you had any abnormal blood loss such as black, tarry or bloody stools, or abnormal vaginal bleeding?  12. NO - Have you ever had a blood transfusion?  13. NO - Have you or any of your relatives ever had problems with anesthesia?  14. NO - Do you have sleep apnea, excessive snoring or daytime  drowsiness?  15. NO - Do you have any prosthetic heart valves?  16. NO - Do you have prosthetic joints?  17. NO - Is there any chance that you may be pregnant?      HPI:     HPI related to upcoming procedure:  Rhinoplasty       See problem list for active medical problems.  Problems all longstanding and stable, except as noted/documented.  See ROS for pertinent symptoms related to these conditions.                                                                                                                                                          .    MEDICAL HISTORY:     Patient Active Problem List    Diagnosis Date Noted     Papanicolaou smear of cervix with low grade squamous intraepithelial lesion (LGSIL) 09/15/2017     Priority: Medium     9/15/17 LSIL pap. Plan: pap only in 1 year  10/3/18 NIL pap. Plan pap in one year.        Non-celiac gluten sensitivity      Priority: Medium     IUD (intrauterine device) in place 09/21/2016     Priority: Medium     Mirena inserted 09/21/16       Airway obstruction, anatomic 05/15/2013     Priority: Medium      Past Medical History:   Diagnosis Date     Acne      Generalized anxiety disorder 01/2014    sertraline 25mg      IBS (irritable bowel syndrome)      Immunizations up to date     Gardasil series completed     IUD (intrauterine device) in place 09/21/2016    Mirena     Non-celiac gluten sensitivity      Oligomenorrhea     Start ocp's 8/8/13. Repeat US for ovarian cyst 11/2013     Papanicolaou smear of cervix with low grade squamous intraepithelial lesion (LGSIL) 9/15/2017    9/15/17 LSIL pap. Plan: pap only in 1 year     Past Surgical History:   Procedure Laterality Date     LAPAROSCOPIC APPENDECTOMY N/A 10/28/2015    Procedure: LAPAROSCOPIC APPENDECTOMY;  Surgeon: Osmin Tirado MD;  Location: [facility] OR     SEPTORHINOPLASTY  5/10/2013    Procedure: SEPTORHINOPLASTY;  Septoplasty/tip Rhinoplasty, Bilateral superficial cauterization of turbinates       TONSILLECTOMY   1998     Current Outpatient Medications   Medication Sig Dispense Refill     levonorgestrel (MIRENA, 52 MG,) 20 MCG/24HR IUD 1 each (20 mcg) by Intrauterine route once for 1 dose 1 each 0     Linaclotide (LINZESS PO) Take 145 mcg by mouth every morning (before breakfast)       sertraline (ZOLOFT) 50 MG tablet Take 1/2 tablet (25 mg) for 1-2 weeks, then increase to 1 tablet orally daily 90 tablet 3     OTC products: none    No Known Allergies   Latex Allergy: NO    Social History     Tobacco Use     Smoking status: Never Smoker     Smokeless tobacco: Never Used   Substance Use Topics     Alcohol use: Yes     Alcohol/week: 0.0 oz     Comment: occ     History   Drug Use No       REVIEW OF SYSTEMS:   CONSTITUTIONAL: NEGATIVE for fever, chills, change in weight  INTEGUMENTARY/SKIN: NEGATIVE for worrisome rashes, moles or lesions  EYES: NEGATIVE for vision changes or irritation  ENT/MOUTH: NEGATIVE for ear, mouth and throat problems  RESP: NEGATIVE for significant cough or SOB  BREAST: NEGATIVE for masses, tenderness or discharge  CV: NEGATIVE for chest pain, palpitations or peripheral edema  GI: NEGATIVE for nausea, abdominal pain, heartburn, or change in bowel habits  : NEGATIVE for frequency, dysuria, or hematuria  MUSCULOSKELETAL: NEGATIVE for significant arthralgias or myalgia  NEURO: NEGATIVE for weakness, dizziness or paresthesias  ENDOCRINE: NEGATIVE for temperature intolerance, skin/hair changes  HEME: NEGATIVE for bleeding problems  PSYCHIATRIC: NEGATIVE for changes in mood or affect    EXAM:   There were no vitals taken for this visit.    GENERAL APPEARANCE: healthy, alert and no distress     EYES: EOMI, PERRL     HENT: ear canals and TM's normal and nose and mouth without ulcers or lesions     NECK: no adenopathy, no asymmetry, masses, or scars and thyroid normal to palpation     RESP: lungs clear to auscultation - no rales, rhonchi or wheezes     CV: regular rates and rhythm, normal S1 S2, no S3 or S4 and  no murmur, click or rub     ABDOMEN:  soft, nontender, no HSM or masses and bowel sounds normal     MS: extremities normal- no gross deformities noted, no evidence of inflammation in joints, FROM in all extremities.     SKIN: no suspicious lesions or rashes     NEURO: Normal strength and tone, sensory exam grossly normal, mentation intact and speech normal     PSYCH: mentation appears normal. and affect normal/bright     LYMPHATICS: No cervical adenopathy    DIAGNOSTICS:     Labs Drawn and in Process:   Unresulted Labs Ordered in the Past 30 Days of this Admission     Date and Time Order Name Status Description    3/6/2019 1515 HEMOGLOBIN In process           Recent Labs   Lab Test 10/28/15  0020   HGB 13.6         POTASSIUM 3.3*   CR 0.86        IMPRESSION:   Reason for surgery/procedure: elective rhinoplasty     The proposed surgical procedure is considered LOW risk.    REVISED CARDIAC RISK INDEX  No serious cardiac risks  INTERPRETATION: 0 risks: Class I (very low risk - 0.4% complication rate)    The patient has the following additional risks for perioperative complications:  No identified additional risks      ICD-10-CM    1. Preop general physical exam Z01.818        RECOMMENDATIONS:       --Patient is to take all scheduled medications on the day of surgery.    APPROVAL GIVEN to proceed with proposed procedure, without further diagnostic evaluation       Signed Electronically by: ELISA Gannon CNM    Copy of this evaluation report is provided to requesting physician.    [facility] Preop Guidelines    Revised Cardiac Risk Index    Preop paperwork completed and sent to be faxed.

## 2019-03-21 ENCOUNTER — TELEPHONE (OUTPATIENT)
Dept: OTOLARYNGOLOGY | Facility: CLINIC | Age: 24
End: 2019-03-21

## 2019-03-21 NOTE — TELEPHONE ENCOUNTER
Ginger, Clinic , informed me that patient was noted to have pre-operative evaluation assessment with Audrey Mcgrath CNM for rhinoplasty.  I called and patient informed me that she did not remember scheduling an appointment with Dr. Waters.  She would like to cancel appointment.  She was appreciative of the call.

## 2019-10-21 ENCOUNTER — TELEPHONE (OUTPATIENT)
Dept: OBGYN | Facility: CLINIC | Age: 24
End: 2019-10-21

## 2019-10-21 NOTE — TELEPHONE ENCOUNTER
Last annual visit with Dr Meyers 10/3/19   IBS-C this year due to ongoing issues with constipation.  Had normal colonoscopy and upper endoscopy.  Doing well on linzess and followed by [facility].  No bladder issues    Called pt to confirm request. Linzess is a Rx from her [facility] physician. Recommended pt request refill from the original prescriber as [facility] follows her IBS issues.  Pt will call them for refill. No further questions.  Jaki Stack RN on 10/21/2019 at 12:15 PM

## 2019-10-21 NOTE — TELEPHONE ENCOUNTER
Pt called wanting information about an rx refill that per her pharmacy they sent us a request for over a month ago. Pt now only has 2 pills left and is asking that we refill this as soon as possible please. Also historical for the med on file says 145 mcg however the pt says that at the last OV with Dr. Meyers she is asking for the 72 mg (they decided to cut it in half from previous). Please contact pt with questions or when refill is approved.

## 2019-10-22 NOTE — PROGRESS NOTES
Vida is a 23 year old  female who presents for annual exam.     Besides routine health maintenance,  she would like to discuss spotting on IUD. Has never had any issues with IUD since insertion in 2016. Patient would also like to discuss increasing her anxiety medications.    HPI:  Pt here today for her annual gyn exam. She typically sees Dr. Meyers.     Hx abnormal pap in 2017, NIL in 2018. Will repeat today.     Mirena IUD in place, has had some spotting for a few days after IC. No cramping. IUD strings seen at last annual and a vaginal check in 2020. Pt does not feel for strings.     She feels her anxiety is getting worse. Has a new job, which is more stressful. Sleeps well. On sertraline. OSORIO scores are good.       GYNECOLOGIC HISTORY:    No LMP recorded. (Menstrual status: IUD).    Her current contraception method is: IUD.  She  reports that she has never smoked. She has never used smokeless tobacco.    Patient is sexually active.  STD testing offered?  Declined  Last PHQ-9 score on record =   PHQ-9 SCORE 10/24/2019   PHQ-9 Total Score 2     Last GAD7 score on record =   OSORIO-7 SCORE 10/24/2019   Total Score 3     Alcohol Score = 4    HEALTH MAINTENANCE:  Cholesterol: (No results found for: CHOL   Last Mammo: NA, due at age 40  Pap: 10/3/18 neg  Colonoscopy: NA, due at age 50  Dexa: Never    Health maintenance updated:  yes    HISTORY:  OB History    Para Term  AB Living   0 0 0 0 0 0   SAB TAB Ectopic Multiple Live Births   0 0 0 0 0       Patient Active Problem List   Diagnosis     Airway obstruction, anatomic     IUD (intrauterine device) in place     Non-celiac gluten sensitivity     Papanicolaou smear of cervix with low grade squamous intraepithelial lesion (LGSIL)     Past Surgical History:   Procedure Laterality Date     LAPAROSCOPIC APPENDECTOMY N/A 10/28/2015    Procedure: LAPAROSCOPIC APPENDECTOMY;  Surgeon: Osmin Tirado MD;  Location: [facility] OR     RHINOPLASTY   "03/2019     SEPTORHINOPLASTY  5/10/2013    Procedure: SEPTORHINOPLASTY;  Septoplasty/tip Rhinoplasty, Bilateral superficial cauterization of turbinates       TONSILLECTOMY  1998      Social History     Tobacco Use     Smoking status: Never Smoker     Smokeless tobacco: Never Used   Substance Use Topics     Alcohol use: Yes     Alcohol/week: 0.0 standard drinks     Comment: occ      Problem (# of Occurrences) Relation (Name,Age of Onset)    Family History Negative (1) Other            Current Outpatient Medications   Medication Sig     levonorgestrel (MIRENA, 52 MG,) 20 MCG/24HR IUD 1 each (20 mcg) by Intrauterine route once for 1 dose     linaclotide (LINZESS) 72 MCG capsule Take 1 capsule (72 mcg) by mouth every morning (before breakfast)     sertraline (ZOLOFT) 50 MG tablet Take 1/2 tablet (25 mg) for 1-2 weeks, then increase to 1 tablet orally daily     No current facility-administered medications for this visit.      No Known Allergies    Past medical, surgical, social and family histories were reviewed and updated in EPIC.    ROS:   12 point review of systems negative other than symptoms noted below.  Head: cold  Psychiatric: Anxiety    EXAM:  /70   Pulse 66   Ht 1.791 m (5' 10.5\")   Wt 73.5 kg (162 lb)   BMI 22.92 kg/m     BMI: Body mass index is 22.92 kg/m .    PHYSICAL EXAM:  Constitutional:   Appearance: Well nourished, well developed, alert, in no acute distress  Neck:  Lymph Nodes:  No lymphadenopathy present    Thyroid:  Gland size normal, nontender, no nodules or masses present  on palpation  Chest:  Respiratory Effort:  Breathing unlabored  Cardiovascular:    Heart: Auscultation:  Regular rate, normal rhythm, no murmurs present  Breasts: Inspection of Breasts:  No lymphadenopathy present., Palpation of Breasts and Axillae:  No masses present on palpation, no breast tenderness., Axillary Lymph Nodes:  No lymphadenopathy present. and No nodularity, asymmetry or nipple discharge " bilaterally.  Gastrointestinal:   Abdominal Examination:  Abdomen nontender to palpation, tone normal without rigidity or guarding, no masses present, umbilicus without lesions   Liver and Spleen:  No hepatomegaly present, liver nontender to palpation    Hernias:  No hernias present  Lymphatic: Lymph Nodes:  No other lymphadenopathy present  Skin:  General Inspection:  No rashes present, no lesions present, no areas of  discoloration  Neurologic:    Mental Status:  Oriented X3.  Normal strength and tone, sensory exam                grossly normal, mentation intact and speech normal.    Psychiatric:   Mentation appears normal and affect normal/bright.         Pelvic Exam:  External Genitalia:     Normal appearance for age, no discharge present, no tenderness present, no inflammatory lesions present, color normal  Vagina:    Normal vaginal vault without central or paravaginal defects, no discharge present, no inflammatory lesions present, no masses present  Bladder:     Nontender to palpation  Urethra:   Urethral Body:  Urethra palpation normal, urethra structural support normal   Urethral Meatus:  No erythema or lesions present  Cervix:     Appearance healthy, no lesions present, nontender to palpation, no bleeding present, string NOT present  Uterus:     Nontender to palpation, no masses present, position anteflexed, mobility: normal  Adnexa:     No adnexal tenderness present, no adnexal masses present  Perineum:     Perineum within normal limits, no evidence of trauma, no rashes or skin lesions present  Anus:     Anus within normal limits, no hemorrhoids present  Inguinal Lymph Nodes:     No lymphadenopathy present  Pubic Hair:     Normal pubic hair distribution for age  Genitalia and Groin:     No rashes present, no lesions present, no areas of discoloration, no masses present      COUNSELING:   Special attention given to:        Regular exercise       Healthy diet/nutrition       Contraception       Safe sex  practices/STD prevention    BMI: Body mass index is 22.92 kg/m .      ASSESSMENT:  23 year old female with satisfactory annual exam.    ICD-10-CM    1. Encounter for gynecological examination without abnormal finding Z01.419 Pap imaged thin layer screen only - recommended age 21 - 24 years   2. Anxiety F41.9    3. Irritable bowel syndrome with constipation K58.1 linaclotide (LINZESS) 72 MCG capsule   4. IUD (intrauterine device) in place Z97.5    5. Intrauterine contraceptive device threads lost, initial encounter T83.32XA US Pelvic Limited       PLAN:  Normal gyn exam. IUD strings not seen. Given pt's anxiety, offered US at any time for reassurance of placement. Pt has sertraline 50 mg at home. Will take 1 1/2 tab daily for 4-6 weeks and call and update OSORIO scores with triage. If working will update RX. Needs small amount of Linzess to get her to her GI appointment next week. rx sent. Pap updated.     ELISA Franco CNP

## 2019-10-24 ENCOUNTER — OFFICE VISIT (OUTPATIENT)
Dept: OBGYN | Facility: CLINIC | Age: 24
End: 2019-10-24
Payer: COMMERCIAL

## 2019-10-24 VITALS
BODY MASS INDEX: 22.68 KG/M2 | DIASTOLIC BLOOD PRESSURE: 70 MMHG | HEART RATE: 66 BPM | HEIGHT: 71 IN | SYSTOLIC BLOOD PRESSURE: 112 MMHG | WEIGHT: 162 LBS

## 2019-10-24 DIAGNOSIS — F41.9 ANXIETY: ICD-10-CM

## 2019-10-24 DIAGNOSIS — K58.1 IRRITABLE BOWEL SYNDROME WITH CONSTIPATION: ICD-10-CM

## 2019-10-24 DIAGNOSIS — Z01.419 ENCOUNTER FOR GYNECOLOGICAL EXAMINATION WITHOUT ABNORMAL FINDING: Primary | ICD-10-CM

## 2019-10-24 DIAGNOSIS — T83.32XA INTRAUTERINE CONTRACEPTIVE DEVICE THREADS LOST, INITIAL ENCOUNTER: ICD-10-CM

## 2019-10-24 DIAGNOSIS — Z97.5 IUD (INTRAUTERINE DEVICE) IN PLACE: ICD-10-CM

## 2019-10-24 PROCEDURE — 99395 PREV VISIT EST AGE 18-39: CPT | Performed by: NURSE PRACTITIONER

## 2019-10-24 PROCEDURE — G0145 SCR C/V CYTO,THINLAYER,RESCR: HCPCS | Performed by: NURSE PRACTITIONER

## 2019-10-24 ASSESSMENT — ANXIETY QUESTIONNAIRES
7. FEELING AFRAID AS IF SOMETHING AWFUL MIGHT HAPPEN: NOT AT ALL
2. NOT BEING ABLE TO STOP OR CONTROL WORRYING: NOT AT ALL
IF YOU CHECKED OFF ANY PROBLEMS ON THIS QUESTIONNAIRE, HOW DIFFICULT HAVE THESE PROBLEMS MADE IT FOR YOU TO DO YOUR WORK, TAKE CARE OF THINGS AT HOME, OR GET ALONG WITH OTHER PEOPLE: SOMEWHAT DIFFICULT
6. BECOMING EASILY ANNOYED OR IRRITABLE: NOT AT ALL
GAD7 TOTAL SCORE: 3
5. BEING SO RESTLESS THAT IT IS HARD TO SIT STILL: NOT AT ALL
3. WORRYING TOO MUCH ABOUT DIFFERENT THINGS: SEVERAL DAYS
1. FEELING NERVOUS, ANXIOUS, OR ON EDGE: SEVERAL DAYS

## 2019-10-24 ASSESSMENT — PATIENT HEALTH QUESTIONNAIRE - PHQ9
SUM OF ALL RESPONSES TO PHQ QUESTIONS 1-9: 2
5. POOR APPETITE OR OVEREATING: SEVERAL DAYS

## 2019-10-24 ASSESSMENT — MIFFLIN-ST. JEOR: SCORE: 1578.02

## 2019-10-24 NOTE — LETTER
October 29, 2019      Vida Alvarez  [address]  [place] [address]    Dear ,      I am happy to inform you that your recent cervical cancer screening test (PAP smear) was normal.      Preventative screenings such as this help to ensure your health for years to come. You should repeat a pap smear in 3 years, unless otherwise directed.      You will still need to return to the clinic every year for your annual exam and other preventive tests.     If you have additional questions regarding this result, please call our registered nurse, Suri at [phone].      Sincerely,      Jaki Lugo, APRN CNP/rlm

## 2019-10-25 ASSESSMENT — ANXIETY QUESTIONNAIRES: GAD7 TOTAL SCORE: 3

## 2019-10-28 LAB
COPATH REPORT: NORMAL
PAP: NORMAL

## 2019-11-07 ENCOUNTER — TELEPHONE (OUTPATIENT)
Dept: OBGYN | Facility: CLINIC | Age: 24
End: 2019-11-07

## 2019-11-07 NOTE — TELEPHONE ENCOUNTER
Pt called wanting to speak to triage about antibiotics/medication while traveling. Please contact on primary number on file

## 2019-11-11 NOTE — TELEPHONE ENCOUNTER
Pt traveling to [place]. Advised call travel clinic for their recommendations.   Bhakti Iniguez RN on 11/11/2019 at 11:07 AM

## 2019-12-27 DIAGNOSIS — F41.9 ANXIETY: ICD-10-CM

## 2019-12-27 ASSESSMENT — ANXIETY QUESTIONNAIRES
5. BEING SO RESTLESS THAT IT IS HARD TO SIT STILL: NOT AT ALL
6. BECOMING EASILY ANNOYED OR IRRITABLE: NOT AT ALL
GAD7 TOTAL SCORE: 0
IF YOU CHECKED OFF ANY PROBLEMS ON THIS QUESTIONNAIRE, HOW DIFFICULT HAVE THESE PROBLEMS MADE IT FOR YOU TO DO YOUR WORK, TAKE CARE OF THINGS AT HOME, OR GET ALONG WITH OTHER PEOPLE: NOT DIFFICULT AT ALL
2. NOT BEING ABLE TO STOP OR CONTROL WORRYING: NOT AT ALL
3. WORRYING TOO MUCH ABOUT DIFFERENT THINGS: NOT AT ALL
1. FEELING NERVOUS, ANXIOUS, OR ON EDGE: NOT AT ALL
7. FEELING AFRAID AS IF SOMETHING AWFUL MIGHT HAPPEN: NOT AT ALL

## 2019-12-27 ASSESSMENT — PATIENT HEALTH QUESTIONNAIRE - PHQ9
5. POOR APPETITE OR OVEREATING: NOT AT ALL
SUM OF ALL RESPONSES TO PHQ QUESTIONS 1-9: 1

## 2019-12-27 NOTE — TELEPHONE ENCOUNTER
"Requested Prescriptions   Pending Prescriptions Disp Refills     sertraline (ZOLOFT) 50 MG tablet [Pharmacy Med Name: SERTRALINE 50MG TABLETS] 90 tablet 3     Sig: TAKE ONE-HALF TABLET(25 MG) FOR 1 TO 2 WEEKS, THEN INCREASE TO 1 TABLET BY MOUTH DAILY       SSRIs Protocol Passed - 12/27/2019  9:52 AM        Passed - Recent (12 mo) or future (30 days) visit within the authorizing provider's specialty     Patient has had an office visit with the authorizing provider or a provider within the authorizing providers department within the previous 12 mos or has a future within next 30 days. See \"Patient Info\" tab in inbasket, or \"Choose Columns\" in Meds & Orders section of the refill encounter.              Passed - Medication is active on med list        Passed - Patient is age 18 or older        Passed - No active pregnancy on record        Passed - No positive pregnancy test in last 12 months        Last Written Prescription Date:  10/3/18  Last Fill Quantity: 90,  # refills: 3   Last office visit: 10/24/2019 with provider:  [facility]        PLAN:  Normal gyn exam. IUD strings not seen. Given pt's anxiety, offered US at any time for reassurance of placement. Pt has sertraline 50 mg at home. Will take 1 1/2 tab daily for 4-6 weeks and call and update OSORIO scores with triage. If working will update RX. Needs small amount of Linzess to get her to her GI appointment next week. rx sent. Pap updated.     Patient called and states feeling a lot better with the 1 and 1/2 tabs daily.  PHQ-9 score 1, OSORIO-7 score 0.   Prescription approved per [facility] Refill Protocol. rx updated and sent.  Luanne Galo RN on 12/27/2019 at 10:52 AM        "

## 2019-12-28 ASSESSMENT — ANXIETY QUESTIONNAIRES: GAD7 TOTAL SCORE: 0

## 2020-08-20 NOTE — PROGRESS NOTES
SUBJECTIVE:                                                   Vida Alvarez is a 24 year old female who presents to clinic today for the following health issue(s):  Patient presents with:  Vaginal Problem: Brownish, red tinge. Patient has not been getting periods while on the IUD. Last time could not see the strings.  Anxiety: Has increased her anxiety medication instead of taking 1.5 she has been taking 2      Additional information: No odor to the discharge    HPI:  Pt here today with c/o vaginal discharge since MOnday. She was s.a. last weekend, no pain. She had brown/red tinge spotting/discharge the following day. She had some bloating, but no cramping.     No odor or itching. Has had BV in the past.     IUD in place. We have been unable to see the strings. Inserted 2016, due for exchange in 2021.    She has a sore throat and stuffy nose and has COVID test scheduled for tomorrow.     No LMP recorded. (Menstrual status: IUD)..     Patient is sexually active, .  Using IUD for contraception.    reports that she has never smoked. She has never used smokeless tobacco.    STD testing offered?  Declined    Health maintenance updated:  yes    Today's PHQ-2 Score: No flowsheet data found.  Today's PHQ-9 Score:   PHQ-9 SCORE 2019   PHQ-9 Total Score 1     Today's OSORIO-7 Score:   OSORIO-7 SCORE 2019   Total Score 0       Problem list and histories reviewed & adjusted, as indicated.  Additional history: as documented.    Patient Active Problem List   Diagnosis     Airway obstruction, anatomic     IUD (intrauterine device) in place     Non-celiac gluten sensitivity     Papanicolaou smear of cervix with low grade squamous intraepithelial lesion (LGSIL)     Past Surgical History:   Procedure Laterality Date     LAPAROSCOPIC APPENDECTOMY N/A 10/28/2015    Procedure: LAPAROSCOPIC APPENDECTOMY;  Surgeon: Osmin Tirado MD;  Location: [facility] OR     RHINOPLASTY  2019     SEPTORHINOPLASTY  5/10/2013     "Procedure: SEPTORHINOPLASTY;  Septoplasty/tip Rhinoplasty, Bilateral superficial cauterization of turbinates       TONSILLECTOMY  1998      Social History     Tobacco Use     Smoking status: Never Smoker     Smokeless tobacco: Never Used   Substance Use Topics     Alcohol use: Yes     Alcohol/week: 0.0 standard drinks     Comment: occ      Problem (# of Occurrences) Relation (Name,Age of Onset)    Family History Negative (1) Other            Current Outpatient Medications   Medication Sig     levonorgestrel (MIRENA, 52 MG,) 20 MCG/24HR IUD 1 each (20 mcg) by Intrauterine route once for 1 dose     linaclotide (LINZESS) 72 MCG capsule Take 1 capsule (72 mcg) by mouth every morning (before breakfast)     sertraline (ZOLOFT) 50 MG tablet Take 1.5 tablets (75 mg) by mouth daily TAKE ONE AND ONE-HALF TABLET(75 MG) BY MOUTH DAILY (Patient taking differently: Take 100 mg by mouth daily TAKE ONE AND ONE-HALF TABLET(75 MG) BY MOUTH DAILY)     No current facility-administered medications for this visit.      No Known Allergies    ROS:  12 point review of systems negative other than symptoms noted below or in the HPI.  Genitourinary: Vaginal Discharge  No urinary frequency or dysuria, bladder or kidney problems      OBJECTIVE:     /66   Temp 98  F (36.7  C)   Ht 1.791 m (5' 10.5\")   Wt 70 kg (154 lb 6.4 oz)   Breastfeeding No   BMI 21.84 kg/m    Body mass index is 21.84 kg/m .    Exam:  Constitutional:  Appearance: Well nourished, well developed alert, in no acute distress  Psychiatric:  Mentation appears normal and affect normal/bright.  Pelvic Exam:  External Genitalia:     Normal appearance for age, no discharge present, no tenderness present, no inflammatory lesions present, color normal  Vagina:    Normal vaginal vault without central or paravaginal defects, MINIMAL BROWN discharge present, no inflammatory lesions present, no masses present  Bladder:     Nontender to palpation  Urethra:   Urethral Body:  Urethra " palpation normal, urethra structural support normal   Urethral Meatus:  No erythema or lesions present  Cervix:     Appearance healthy, no lesions present, nontender to palpation, no bleeding present, string NOT present  Uterus:     Nontender to palpation, no masses present, position anteflexed, mobility: normal  Adnexa:     No adnexal tenderness present, no adnexal masses present  Perineum:     Perineum within normal limits, no evidence of trauma, no rashes or skin lesions present  Anus:     Anus within normal limits, no hemorrhoids present  Inguinal Lymph Nodes:     No lymphadenopathy present  Pubic Hair:     Normal pubic hair distribution for age  Genitalia and Groin:     No rashes present, no lesions present, no areas of discoloration, no masses present       In-Clinic Test Results:  Results for orders placed or performed in visit on 08/21/20 (from the past 24 hour(s))   Wet prep    Specimen: Vagina   Result Value Ref Range    Specimen Description Vagina     Wet Prep No Trichomonas seen     Wet Prep No clue cells seen     Wet Prep No yeast seen     Wet Prep No WBC's seen     Wet Prep (Note)  Lacto present          ASSESSMENT/PLAN:                                                        ICD-10-CM    1. Vaginal discharge  N89.8 Wet prep       There are no Patient Instructions on file for this visit.    Wet prep: negative.  Reassurance. Likely spotting. Offered early exchange if spotting gets heavier. Unable to see IUD strings.     ELISA Franco [facility]

## 2020-08-21 ENCOUNTER — OFFICE VISIT (OUTPATIENT)
Dept: OBGYN | Facility: CLINIC | Age: 25
End: 2020-08-21
Payer: COMMERCIAL

## 2020-08-21 ENCOUNTER — VIRTUAL VISIT (OUTPATIENT)
Dept: FAMILY MEDICINE | Facility: OTHER | Age: 25
End: 2020-08-21

## 2020-08-21 VITALS
BODY MASS INDEX: 21.61 KG/M2 | TEMPERATURE: 98 F | SYSTOLIC BLOOD PRESSURE: 112 MMHG | DIASTOLIC BLOOD PRESSURE: 66 MMHG | WEIGHT: 154.4 LBS | HEIGHT: 71 IN

## 2020-08-21 DIAGNOSIS — N89.8 VAGINAL DISCHARGE: Primary | ICD-10-CM

## 2020-08-21 LAB
SPECIMEN SOURCE: NORMAL
WET PREP SPEC: NORMAL

## 2020-08-21 PROCEDURE — 87210 SMEAR WET MOUNT SALINE/INK: CPT | Performed by: NURSE PRACTITIONER

## 2020-08-21 PROCEDURE — 99212 OFFICE O/P EST SF 10 MIN: CPT | Performed by: NURSE PRACTITIONER

## 2020-08-21 ASSESSMENT — MIFFLIN-ST. JEOR: SCORE: 1538.54

## 2020-08-21 NOTE — PROGRESS NOTES
"Date: 2020 05:30:02  Clinician: Margret Coles  Clinician NPI: [id number]  Patient: Vida Alvarez  Patient : 1995  Patient Address: [address] [place] [address]  Patient Phone: [phone]  Visit Protocol: URI  Patient Summary:  Vida is a 24 year old ( : 1995 ) female who initiated a Visit for COVID-19 (Coronavirus) evaluation and screening. When asked the question \"Please sign me up to receive news, health information and promotions. \", Vida responded \"No\".    Vida states her symptoms started 1-2 days ago.   Her symptoms consist of malaise, a sore throat, and nasal congestion.   Symptom details     Nasal secretions: The color of her mucus is yellow and clear.    Sore throat: Vida reports having moderate throat pain (4-6 on a 10 point pain scale), does not have exudate on her tonsils, and can swallow liquids. The lymph nodes in her neck are not enlarged. A rash has not appeared on the skin since the sore throat started.      Vida denies having ear pain, headache, chills, enlarged lymph nodes, fever, wheezing, teeth pain, ageusia, diarrhea, cough, vomiting, rhinitis, nausea, myalgias, anosmia, and facial pain or pressure. She also denies having recent facial or sinus surgery in the past 60 days and taking antibiotic medication in the past month. She is not experiencing dyspnea.   Precipitating events  Vida is not sure if she has been exposed to someone with strep throat.   Pertinent COVID-19 (Coronavirus) information  In the past 14 days, Vida has not worked in a congregate living setting.   She does not work or volunteer as healthcare worker or a  and does not work or volunteer in a healthcare facility.   Vida also has not lived in a congregate living setting in the past 14 days. She does not live with a healthcare worker.   Vida has not had a close contact with a laboratory-confirmed COVID-19 patient within 14 days of symptom onset.   Since December " 2019, Vida and has not had upper respiratory infection or influenza-like illness. Has not been diagnosed with lab-confirmed COVID-19 test   Pertinent medical history  Vida does not get yeast infections when she takes antibiotics.   Vida needs a return to work/school note.   Weight: 154 lbs   Vida does not smoke or use smokeless tobacco.   She denies pregnancy and denies breastfeeding. She does not menstruate.   Weight: 154 lbs    MEDICATIONS: sertraline oral, Linzess oral, ALLERGIES: NKDA  Clinician Response:  Dear Vida,   Your symptoms show that you may have coronavirus (COVID-19). This illness can cause fever, cough and trouble breathing. Many people get a mild case and get better on their own. Some people can get very sick.  What should I do?  We would like to test you for this virus.   1. Please call [phone] to schedule your visit. Explain that you were referred by [facility] to have a COVID-19 test. Be ready to [facility].  The following will serve as your written order for this COVID Test, ordered by me, for the indication of suspected COVID [Z20.828]: The test will be ordered in [facility], our electronic health record, after you are scheduled. It will show as ordered and authorized by Alexandro Perales MD.  Order: COVID-19 (Coronavirus) PCR for SYMPTOMATIC testing from [facility].      2. When it's time for your COVID test:  Stay at least 6 feet away from others. (If someone will drive you to your test, stay in the backseat, as far away from the  as you can.)   Cover your mouth and nose with a mask, tissue or washcloth.  Go straight to the testing site. Don't make any stops on the way there or back.      3.Starting now: Stay home and away from others (self-isolate) until:   You've had no fever---and no medicine that reduces fever---for one full day (24 hours). And...   Your other symptoms have gotten better. For example, your cough or breathing has improved. And...   At least 10 days have  "passed since your symptoms started.       During this time, don't leave the house except for testing or medical care.   Stay in your own room, even for meals. Use your own bathroom if you can.   Stay away from others in your home. No hugging, kissing or shaking hands. No visitors.  Don't go to work, school or anywhere else.    Clean \"high touch\" surfaces often (doorknobs, counters, handles, etc.). Use a household cleaning spray or wipes. You'll find a full list of  on the EPA website: www.epa.gov/pesticide-registration/list-n-disinfectants-use-against-sars-cov-2.   Cover your mouth and nose with a mask, tissue or washcloth to avoid spreading germs.  Wash your hands and face often. Use soap and water.  Caregivers in these groups are at risk for severe illness due to COVID-19:  o People 65 years and older  o People who live in a nursing home or long-term care facility  o People with chronic disease (lung, heart, cancer, diabetes, kidney, liver, immunologic)  o People who have a weakened immune system, including those who:   Are in cancer treatment  Take medicine that weakens the immune system, such as corticosteroids  Had a bone marrow or organ transplant  Have an immune deficiency  Have poorly controlled HIV or AIDS  Are obese (body mass index of 40 or higher)  Smoke regularly   o Caregivers should wear gloves while washing dishes, handling laundry and cleaning bedrooms and bathrooms.  o Use caution when washing and drying laundry: Don't shake dirty laundry, and use the warmest water setting that you can.  o For more tips, go to www.cdc.gov/coronavirus/2019-ncov/downloads/10Things.pdf.    4.Sign up for [employer]. We know it's scary to hear that you might have COVID-19. We want to track your symptoms to make sure you're okay over the next 2 weeks. Please look for an email from [employer]---this is a free, online program that we'll use to keep in touch. To sign up, follow the link in the email. Learn more " at [email or web address]  How can I take care of myself?   Get lots of rest. Drink extra fluids (unless a doctor has told you not to).   Take Tylenol (acetaminophen) for fever or pain. If you have liver or kidney problems, ask your family doctor if it's okay to take Tylenol.   Adults can take either:    650 mg (two 325 mg pills) every 4 to 6 hours, or...   1,000 mg (two 500 mg pills) every 8 hours as needed.    Note: Don't take more than 3,000 mg in one day. Acetaminophen is found in many medicines (both prescribed and over-the-counter medicines). Read all labels to be sure you don't take too much.   For children, check the Tylenol bottle for the right dose. The dose is based on the child's age or weight.    If you have other health problems (like cancer, heart failure, an organ transplant or severe kidney disease): Call your specialty clinic if you don't feel better in the next 2 days.       Know when to call 911. Emergency warning signs include:    Trouble breathing or shortness of breath Pain or pressure in the chest that doesn't go away Feeling confused like you haven't felt before, or not being able to wake up Bluish-colored lips or face.  Where can I get more information?   [facility] -- About COVID-19: [email or web address]   CDC -- What to Do If You're Sick: www.cdc.gov/coronavirus/2019-ncov/about/steps-when-sick.html   CDC -- Ending Home Isolation: www.cdc.gov/coronavirus/2019-ncov/hcp/disposition-in-home-patients.html   CDC -- Caring for Someone: www.cdc.gov/coronavirus/2019-ncov/if-you-are-sick/care-for-someone.html   [facility] -- Interim Guidance for Hospital Discharge to Home: www.health.[facility].[place]/diseases/coronavirus/hcp/hospdischarge.pdf   [facility] clinical trials (COVID-19 research studies): clinicalaffairs.[facility]-clinical-trials    Below are the COVID-19 hotlines at the [place] Department of Health ([facility]). Interpreters are available.    [facility]  questions: Call 717-087-3210 or 1-618.698.5457 (7 a.m. to 7 p.m.) For questions about schools and childcare: Call 537-311-1866 or 1-223.998.4767 (7 a.m. to 7 p.m.)    Diagnosis: Acute pharyngitis, unspecified  Diagnosis ICD: J02.9

## 2020-08-24 ENCOUNTER — VIRTUAL VISIT (OUTPATIENT)
Dept: FAMILY MEDICINE | Facility: OTHER | Age: 25
End: 2020-08-24

## 2020-08-24 DIAGNOSIS — Z20.822 SUSPECTED COVID-19 VIRUS INFECTION: Primary | ICD-10-CM

## 2020-08-24 NOTE — PROGRESS NOTES
"Date: 2020 09:48:10  Clinician: Jong Preston  Clinician NPI: [id number]  Patient: Vida Alvarez  Patient : 1995  Patient Address: [address] [place] [address]  Patient Phone: [phone]  Visit Protocol: URI  Patient Summary:  Vida is a 24 year old ( : 1995 ) female who initiated a Visit for COVID-19 (Coronavirus) evaluation and screening. When asked the question \"Please sign me up to receive news, health information and promotions. \", Vida responded \"No\".    Vida states her symptoms started 1-2 days ago.   Her symptoms consist of ear pain, a headache, malaise, a sore throat, a cough, nasal congestion, and rhinitis. She is experiencing difficulty breathing due to nasal congestion but she is not short of breath.   Symptom details     Nasal secretions: The color of her mucus is yellow and clear.    Cough: Vida coughs a few times an hour and her cough is not more bothersome at night. Phlegm does not come into her throat when she coughs. She does not believe her cough is caused by post-nasal drip.     Sore throat: Vida reports having moderate throat pain (4-6 on a 10 point pain scale), does not have exudate on her tonsils, and can swallow liquids. The lymph nodes in her neck are not enlarged. A rash has not appeared on the skin since the sore throat started.     Headache: She states the headache is mild (1-3 on a 10 point pain scale).      Vida denies having chills, enlarged lymph nodes, fever, wheezing, teeth pain, ageusia, diarrhea, vomiting, nausea, myalgias, anosmia, and facial pain or pressure. She also denies having a sinus infection within the past year, having recent facial or sinus surgery in the past 60 days, and taking antibiotic medication in the past month.   Precipitating events  Within the past week, Vida has not been exposed to someone with strep throat. She has not recently been exposed to someone with influenza. Vida has not been in close contact with any " high risk individuals.   Pertinent COVID-19 (Coronavirus) information  In the past 14 days, Vida has not worked in a congregate living setting.   She does not work or volunteer as healthcare worker or a  and does not work or volunteer in a healthcare facility.   Vida also has not lived in a congregate living setting in the past 14 days. She does not live with a healthcare worker.   Vida has not had a close contact with a laboratory-confirmed COVID-19 patient within 14 days of symptom onset.   Since December 2019, Vida and has not had upper respiratory infection or influenza-like illness. Has not been diagnosed with lab-confirmed COVID-19 test   Pertinent medical history  Vida does not get yeast infections when she takes antibiotics.   Vida needs a return to work/school note.   Weight: 154 lbs   Vida does not smoke or use smokeless tobacco.   She denies pregnancy and denies breastfeeding. She does not menstruate.   Weight: 154 lbs    MEDICATIONS: sertraline oral, Linzess oral, ALLERGIES: NKDA  Clinician Response:  Dear Vida,   Your symptoms show that you may have coronavirus (COVID-19). This illness can cause fever, cough and trouble breathing. Many people get a mild case and get better on their own. Some people can get very sick.  What should I do?  We would like to test you for this virus.   1. Please call [phone] to schedule your visit. Explain that you were referred by [facility] to have a COVID-19 test. Be ready to [facility].  The following will serve as your written order for this COVID Test, ordered by me, for the indication of suspected COVID [Z20.828]: The test will be ordered in [facility], our electronic health record, after you are scheduled. It will show as ordered and authorized by Alexandro Perales MD.  Order: COVID-19 (Coronavirus) PCR for SYMPTOMATIC testing [facility].      2. When it's time for your COVID test:  Stay at least 6 feet away from others. (If someone  "will drive you to your test, stay in the backseat, as far away from the  as you can.)   Cover your mouth and nose with a mask, tissue or washcloth.  Go straight to the testing site. Don't make any stops on the way there or back.      3.Starting now: Stay home and away from others (self-isolate) until:   You've had no fever---and no medicine that reduces fever---for one full day (24 hours). And...   Your other symptoms have gotten better. For example, your cough or breathing has improved. And...   At least 10 days have passed since your symptoms started.       During this time, don't leave the house except for testing or medical care.   Stay in your own room, even for meals. Use your own bathroom if you can.   Stay away from others in your home. No hugging, kissing or shaking hands. No visitors.  Don't go to work, school or anywhere else.    Clean \"high touch\" surfaces often (doorknobs, counters, handles, etc.). Use a household cleaning spray or wipes. You'll find a full list of  on the EPA website: www.epa.gov/pesticide-registration/list-n-disinfectants-use-against-sars-cov-2.   Cover your mouth and nose with a mask, tissue or washcloth to avoid spreading germs.  Wash your hands and face often. Use soap and water.  Caregivers in these groups are at risk for severe illness due to COVID-19:  o People 65 years and older  o People who live in a nursing home or long-term care facility  o People with chronic disease (lung, heart, cancer, diabetes, kidney, liver, immunologic)  o People who have a weakened immune system, including those who:   Are in cancer treatment  Take medicine that weakens the immune system, such as corticosteroids  Had a bone marrow or organ transplant  Have an immune deficiency  Have poorly controlled HIV or AIDS  Are obese (body mass index of 40 or higher)  Smoke regularly   o Caregivers should wear gloves while washing dishes, handling laundry and cleaning bedrooms and bathrooms.  o " Use caution when washing and drying laundry: Don't shake dirty laundry, and use the warmest water setting that you can.  o For more tips, go to www.cdc.gov/coronavirus/2019-ncov/downloads/10Things.pdf.    4.Sign up for [employer]. We know it's scary to hear that you might have COVID-19. We want to track your symptoms to make sure you're okay over the next 2 weeks. Please look for an email from [employer]---this is a free, online program that we'll use to keep in touch. To sign up, follow the link in the email. Learn more at [email or web address]  How can I take care of myself?   Get lots of rest. Drink extra fluids (unless a doctor has told you not to).   Take Tylenol (acetaminophen) for fever or pain. If you have liver or kidney problems, ask your family doctor if it's okay to take Tylenol.   Adults can take either:    650 mg (two 325 mg pills) every 4 to 6 hours, or...   1,000 mg (two 500 mg pills) every 8 hours as needed.    Note: Don't take more than 3,000 mg in one day. Acetaminophen is found in many medicines (both prescribed and over-the-counter medicines). Read all labels to be sure you don't take too much.   For children, check the Tylenol bottle for the right dose. The dose is based on the child's age or weight.    If you have other health problems (like cancer, heart failure, an organ transplant or severe kidney disease): Call your specialty clinic if you don't feel better in the next 2 days.       Know when to call 911. Emergency warning signs include:    Trouble breathing or shortness of breath Pain or pressure in the chest that doesn't go away Feeling confused like you haven't felt before, or not being able to wake up Bluish-colored lips or face.  Where can I get more information?    [facility] -- About COVID-19: [email or web address]   CDC -- What to Do If You're Sick: www.cdc.gov/coronavirus/2019-ncov/about/steps-when-sick.html   CDC -- Ending Home Isolation:  www.cdc.gov/coronavirus/2019-ncov/hcp/disposition-in-home-patients.html   [facility] -- Caring for Someone: www.cdc.gov/coronavirus/2019-ncov/if-you-are-sick/care-for-someone.html   [facility] -- Interim Guidance for Hospital Discharge to Home: www.[facility].[place]/diseases/coronavirus/hcp/hospdischarge.pdf   [facility] clinical trials (COVID-19 research studies): clinicalaffairs.[facility]-clinical-trials    Below are the COVID-19 hotlines at the [place] Department of Health ([facility]). Interpreters are available.    For health questions: Call 915-835-5246 or 1-182.828.8991 (7 a.m. to 7 p.m.) For questions about schools and childcare: Call 012-738-6779 or 1-444.513.6419 (7 a.m. to 7 p.m.)    Diagnosis: Other malaise  Diagnosis ICD: R53.81

## 2020-08-25 LAB
SARS-COV-2 RNA SPEC QL NAA+PROBE: NOT DETECTED
SPECIMEN SOURCE: NORMAL

## 2021-01-06 DIAGNOSIS — F41.9 ANXIETY: ICD-10-CM

## 2021-01-06 NOTE — TELEPHONE ENCOUNTER
"Requested Prescriptions   Pending Prescriptions Disp Refills     sertraline (ZOLOFT) 50 MG tablet [Pharmacy Med Name: SERTRALINE 50MG TABLETS] 135 tablet 3     Sig: TAKE ONE AND ONE-HALF TABLETS BY MOUTH DAILY       SSRIs Protocol Passed - 1/6/2021  3:34 AM        Passed - Recent (12 mo) or future (30 days) visit within the authorizing provider's specialty     Patient has had an office visit with the authorizing provider or a provider within the authorizing providers department within the previous 12 mos or has a future within next 30 days. See \"Patient Info\" tab in inbasket, or \"Choose Columns\" in Meds & Orders section of the refill encounter.              Passed - Medication is active on med list        Passed - Patient is age 18 or older        Passed - No active pregnancy on record        Passed - No positive pregnancy test in last 12 months           Last Written Prescription Date:  12/27/19  Last Fill Quantity: 135,  # refills: 3   Last office visit: 8/21/2020 with prescribing provider:  AUSTIN Lugo NP   Future Office Visit:    Last annual exam  With SM: 10/24/19  Pt reporting to pharmacy she is taking 100mg daily - no report of this dose change by AUSTIN Lugo NP   Medication is being filled for 1 time refill only due to:  Patient needs to be seen because annual overdue.  Jaki Stack RN on 1/6/2021 at 8:13 AM      "

## 2021-01-15 ENCOUNTER — HEALTH MAINTENANCE LETTER (OUTPATIENT)
Age: 26
End: 2021-01-15

## 2021-04-01 DIAGNOSIS — F41.9 ANXIETY: ICD-10-CM

## 2021-04-01 NOTE — TELEPHONE ENCOUNTER
"Requested Prescriptions   Pending Prescriptions Disp Refills     sertraline (ZOLOFT) 50 MG tablet [Pharmacy Med Name: SERTRALINE 50MG TABLETS] 45 tablet 0     Sig: TAKE 1 AND 1/2 TABLETS(75 MG) BY MOUTH DAILY       SSRIs Protocol Passed - 4/1/2021  3:35 AM        Passed - Recent (12 mo) or future (30 days) visit within the authorizing provider's specialty     Patient has had an office visit with the authorizing provider or a provider within the authorizing providers department within the previous 12 mos or has a future within next 30 days. See \"Patient Info\" tab in inbasket, or \"Choose Columns\" in Meds & Orders section of the refill encounter.              Passed - Medication is active on med list        Passed - Patient is age 18 or older        Passed - No active pregnancy on record        Passed - No positive pregnancy test in last 12 months           Last Written Prescription Date:  1/6/21  Last Fill Quantity: 45,  # refills: 0   Last office visit: 8/21/2020 with prescribing provider:  Parish  Last annual visit 10/24/19  Future Office Visit:    Pt due for annual, no appt scheduled. Pt already received one month extension. Rx denied.   Luanne Galo RN on 4/1/2021 at 9:37 AM            " Name band;

## 2021-10-24 ENCOUNTER — HEALTH MAINTENANCE LETTER (OUTPATIENT)
Age: 26
End: 2021-10-24

## 2021-11-01 ENCOUNTER — OFFICE VISIT (OUTPATIENT)
Dept: MIDWIFE SERVICES | Facility: CLINIC | Age: 26
End: 2021-11-01
Payer: COMMERCIAL

## 2021-11-01 VITALS — DIASTOLIC BLOOD PRESSURE: 60 MMHG | SYSTOLIC BLOOD PRESSURE: 112 MMHG | WEIGHT: 156 LBS | BODY MASS INDEX: 22.07 KG/M2

## 2021-11-01 DIAGNOSIS — Z97.5 IUD (INTRAUTERINE DEVICE) IN PLACE: ICD-10-CM

## 2021-11-01 DIAGNOSIS — Z11.3 ROUTINE SCREENING FOR STI (SEXUALLY TRANSMITTED INFECTION): ICD-10-CM

## 2021-11-01 DIAGNOSIS — Z30.433 ENCOUNTER FOR REMOVAL AND REINSERTION OF INTRAUTERINE CONTRACEPTIVE DEVICE: Primary | ICD-10-CM

## 2021-11-01 LAB — HCG UR QL: NEGATIVE

## 2021-11-01 PROCEDURE — 58300 INSERT INTRAUTERINE DEVICE: CPT | Performed by: ADVANCED PRACTICE MIDWIFE

## 2021-11-01 PROCEDURE — 58301 REMOVE INTRAUTERINE DEVICE: CPT | Performed by: ADVANCED PRACTICE MIDWIFE

## 2021-11-01 PROCEDURE — 87591 N.GONORRHOEAE DNA AMP PROB: CPT | Performed by: ADVANCED PRACTICE MIDWIFE

## 2021-11-01 PROCEDURE — 81025 URINE PREGNANCY TEST: CPT | Performed by: ADVANCED PRACTICE MIDWIFE

## 2021-11-01 PROCEDURE — 87491 CHLMYD TRACH DNA AMP PROBE: CPT | Performed by: ADVANCED PRACTICE MIDWIFE

## 2021-11-01 RX ORDER — BUPROPION HYDROCHLORIDE 300 MG/1
300 TABLET ORAL DAILY
Qty: 30 TABLET | Refills: 3 | Status: CANCELLED | OUTPATIENT
Start: 2021-11-01

## 2021-11-01 RX ORDER — SERTRALINE HYDROCHLORIDE 100 MG/1
TABLET, FILM COATED ORAL
COMMUNITY
Start: 2021-06-19

## 2021-11-01 RX ORDER — BUPROPION HYDROCHLORIDE 300 MG/1
300 TABLET ORAL DAILY
COMMUNITY
Start: 2021-08-23

## 2021-11-01 RX ORDER — SERTRALINE HYDROCHLORIDE 100 MG/1
TABLET, FILM COATED ORAL
Qty: 30 TABLET | Refills: 3 | Status: CANCELLED | OUTPATIENT
Start: 2021-11-01

## 2021-11-01 NOTE — PROGRESS NOTES
CC/HPI: Vida Alvarez is a 25 year old who presents to the clinic for an IUD removal and insertion.   Patient's last menstrual period was 10/01/2021 (approximate)..   Current birth control method: IUD  Indication for insertion:   birth control and cycle control/menorrhagia.  Her Mirena IUD was placed just over 5 years ago but recently started bleeding and would like her IUD replaced for cycle control   Patient has been provided with written information.  I have reviewed the risks of the IUD including pregnancy, PID, life threatening infection, perforation, expulsion, cramping, changes in bleeding and ovarian cysts. Benefits of the IUD and alternative birth control and cycle control methods have been discussed.  Patients questions have been answered.  Patient has verbalized understanding of risks and benefits and has signed the consent form.    No Known Allergies  Current Outpatient Medications   Medication Sig Dispense Refill     buPROPion (WELLBUTRIN XL) 300 MG 24 hr tablet Take 300 mg by mouth daily       levonorgestrel (MIRENA) 20 MCG/24HR IUD 1 each (20 mcg) by Intrauterine route once       linaclotide (LINZESS) 72 MCG capsule Take 1 capsule (72 mcg) by mouth every morning (before breakfast) 30 capsule 0     sertraline (ZOLOFT) 100 MG tablet TAKE ONE AND ONE-HALF TABLETS BY MOUTH DAILY       levonorgestrel (MIRENA, 52 MG,) 20 MCG/24HR IUD 1 each (20 mcg) by Intrauterine route once for 1 dose 1 each 0      Past Medical History:   Diagnosis Date     Acne      Generalized anxiety disorder 01/2014    sertraline 25mg      IBS (irritable bowel syndrome)      Immunizations up to date     Gardasil series completed     IUD (intrauterine device) in place 09/21/2016    Mirena     Non-celiac gluten sensitivity      Oligomenorrhea     Start ocp's 8/8/13. Repeat US for ovarian cyst 11/2013     Papanicolaou smear of cervix with low grade squamous intraepithelial lesion (LGSIL) 9/15/2017    9/15/17 LSIL pap. Plan: pap only  in 1 year     Family History   Problem Relation Age of Onset     Family History Negative Other      Social History     Socioeconomic History     Marital status:      Spouse name: Not on file     Number of children: 0     Years of education: Not on file     Highest education level: Not on file   Occupational History     Occupation:      Employer: [employer]   Tobacco Use     Smoking status: Never Smoker     Smokeless tobacco: Never Used   Substance and Sexual Activity     Alcohol use: Yes     Alcohol/week: 0.0 standard drinks     Comment: occ     Drug use: No     Sexual activity: Yes     Partners: Male     Birth control/protection: I.U.D.     Comment: Mirena   Other Topics Concern     Parent/sibling w/ CABG, MI or angioplasty before 65F 55M? Not Asked   Social History Narrative     Not on file     Social Determinants of Health     Financial Resource Strain:      Difficulty of Paying Living Expenses:    Food Insecurity:      Worried About Running Out of Food in the Last Year:      Ran Out of Food in the Last Year:    Transportation Needs:      Lack of Transportation (Medical):      Lack of Transportation (Non-Medical):    Physical Activity:      Days of Exercise per Week:      Minutes of Exercise per Session:    Stress:      Feeling of Stress :    Social Connections:      Frequency of Communication with Friends and Family:      Frequency of Social Gatherings with Friends and Family:      Attends [employer] Services:      Active Member of Clubs or Organizations:      Attends Club or Organization Meetings:      Marital Status:    Intimate Partner Violence:      Fear of Current or Ex-Partner:      Emotionally Abused:      Physically Abused:      Sexually Abused:      Past Surgical History:   Procedure Laterality Date     LAPAROSCOPIC APPENDECTOMY N/A 10/28/2015    Procedure: LAPAROSCOPIC APPENDECTOMY;  Surgeon: Osmin Tirado MD;  Location: [facility] OR     RHINOPLASTY  03/2019      SEPTORHINOPLASTY  5/10/2013    Procedure: SEPTORHINOPLASTY;  Septoplasty/tip Rhinoplasty, Bilateral superficial cauterization of turbinates       TONSILLECTOMY  1998         EXAM:  /60 (BP Location: Right arm, Patient Position: Sitting)   Wt 70.8 kg (156 lb)   LMP 10/01/2021 (Approximate)   Breastfeeding No   BMI 22.07 kg/m    PELVIC EXAM:  Vulva: No external lesions, normal hair distribution, no adenopathy, BUS WNL  Vagina: Moist, pink, no abnormal discharge, well rugated, no lesions  Cervix: smooth, pink, no visible lesions, neg CMT The IUD strings were identified at the cervical os  They were easily grasped with a ring forceps and with gentle steady traction the IUD was removed from the uterus intact and disposed of following the hazardous waste guidelines.     Uterus: Normal size, Anteverted , non-tender, mobile  Ovaries: No mass, non-tender, mobile  Rectal exam: deferred    IUD type: Mirena  Lot # [id number]  NDC# [id number] Mirena    EXP DATE:   7/2023        Procedure:  Uterus assessed for position and is Anteverted.  Speculum inserted.  Betadine prep of cervix done.  Tenaculum applied at  10/2  and gentle traction was appiled to elongate the cervical canal.  Uterus sounded to 8 cm's.     IUD inserted in the usual fashion according to manufacture's instructions, without significant resistance, severe protracted pain or excessive bleeding. The tenaculum was removed with scant bleeding from the puncture sites   Strings trimmed to 3 cm's.  Patient  tolerated the procedure well without any prolonged pain or syncopy.    ASSESSMENT/ PLAN:  (Z30.433) Encounter for removal and reinsertion of intrauterine contraceptive device  (primary encounter diagnosis)  Comment:   Plan: levonorgestrel (MIRENA) 20 MCG/24HR IUD 20 mcg,        INSERTION INTRAUTERINE DEVICE, REMOVE         INTRAUTERINE DEVICE, HCG Qual, Urine ([id number]),        Chlamydia trachomatis PCR, Neisseria         gonorrhoeae PCR              (Z11.3)  Routine screening for STI (sexually transmitted infection)  Comment:   Plan: Chlamydia trachomatis PCR, Neisseria         gonorrhoeae PCR                Instructions given to patient regarding checking IUD strings, returning to the clinic if pain, fever, unusual vaginal discharge or inability to check strings and/or irregular bleeding and avoiding placing anything in her vagina for the next 24 hours.  BUM of birth control not indicated   Return to the clinic in 4-6 weeks for IUD follow up/prn  See AVS for complete instructions

## 2021-11-01 NOTE — PATIENT INSTRUCTIONS
What Mirena Users May Expect    What to watch for right after Mirena is placed  Some women may experience uterine cramps, bleeding, and/or dizziness during and right after Mirena is placed. To help minimize the cramps, you may taken ibuprofen 600 mg with food prior to and every 6 hours after your appointment if needed. These symptoms should improve over the next 24 hours.  Mild cramping may be present for a few days after your placement  As a follow up, you should visit your clinic once in the first 4 to 12 weeks after Mirena is placed to make sure it is in the right position. After that, Mirena can be checked once a year as part of your routine exam.    Please use a back-up method (abstinence or condoms) for 7 days after placement. Unless instructed differently at your appointment    Your periods may change  For the first 3 to 6 months, your monthly period may become irregular. You may also have frequent spotting or light bleeding. A few women have heavy bleeding during this time. After your body adjusts, the number of bleeding days is likely to decrease (but may remain irregular), and you may even find that your periods stop altogether for as long as Mirena is in place. Around the end of the third month of use, you may see up to a 75% reduction in the amount of menstrual bleeding. By one year, about 1 out of 5 users may hay have no period at all. At the end of two years, 70% have little or no bleeding. Your periods will return rapidly once Mirena is removed.     Mirena Strings  You may check your own Mirena strings by inserting a finger into the vagina and feeling the strings as they exit the cervix.  The strings will initially feel firm, like fishing line, but will soften over a few weeks.  After the strings have softened, you or your partner should not be able to feel the strings during intercourse. If your partner continues to feel the strings you can have them shortened by your provider If you can feel the  IUD, see your healthcare provider to have the position confirmed.  You may use tampons with Mirena in place.    Mirena does not protect against HIV or STDs.  Mirena does not prevent the formation of ovarian cysts.  Mirena does not typically reduce acne or cause weight gain or mood changes.    Call the clinic immediately if you:  Notice any change in the length of the strings or can feel part of the IUD  Have pain or bleeding with sex.  Have unusually heavy bleeding from the vagina.   Think you are pregnant  Have been or might have been exposed to a sexually transmitted infection  Have unusual pelvic pain, cramping or soreness in your abdomen  Have unexplained fever or chills.       Please call [facility] [phone]  if you have questions or concerns.    For more information:  [email or web address]

## 2021-11-02 LAB
C TRACH DNA SPEC QL NAA+PROBE: NEGATIVE
N GONORRHOEA DNA SPEC QL NAA+PROBE: NEGATIVE

## 2022-02-13 ENCOUNTER — HEALTH MAINTENANCE LETTER (OUTPATIENT)
Age: 27
End: 2022-02-13

## 2022-10-15 ENCOUNTER — HEALTH MAINTENANCE LETTER (OUTPATIENT)
Age: 27
End: 2022-10-15

## 2023-03-26 ENCOUNTER — HEALTH MAINTENANCE LETTER (OUTPATIENT)
Age: 28
End: 2023-03-26

## 2024-05-26 ENCOUNTER — HEALTH MAINTENANCE LETTER (OUTPATIENT)
Age: 29
End: 2024-05-26